# Patient Record
Sex: FEMALE | Race: BLACK OR AFRICAN AMERICAN | NOT HISPANIC OR LATINO | Employment: UNEMPLOYED
[De-identification: names, ages, dates, MRNs, and addresses within clinical notes are randomized per-mention and may not be internally consistent; named-entity substitution may affect disease eponyms.]

---

## 2017-01-02 ENCOUNTER — RECORDS - HEALTHEAST (OUTPATIENT)
Dept: ADMINISTRATIVE | Facility: OTHER | Age: 32
End: 2017-01-02

## 2017-06-12 ENCOUNTER — HOSPITAL ENCOUNTER (EMERGENCY)
Facility: CLINIC | Age: 32
Discharge: HOME | End: 2017-06-12
Attending: EMERGENCY MEDICINE | Admitting: EMERGENCY MEDICINE
Payer: COMMERCIAL

## 2017-06-12 ENCOUNTER — APPOINTMENT (OUTPATIENT)
Dept: CT IMAGING | Facility: CLINIC | Age: 32
End: 2017-06-12
Attending: EMERGENCY MEDICINE
Payer: COMMERCIAL

## 2017-06-12 VITALS
SYSTOLIC BLOOD PRESSURE: 143 MMHG | DIASTOLIC BLOOD PRESSURE: 99 MMHG | TEMPERATURE: 98.5 F | RESPIRATION RATE: 18 BRPM | OXYGEN SATURATION: 96 % | HEART RATE: 84 BPM

## 2017-06-12 DIAGNOSIS — R55 SYNCOPE AND COLLAPSE: ICD-10-CM

## 2017-06-12 LAB
ANION GAP SERPL CALCULATED.3IONS-SCNC: 4 MMOL/L (ref 3–14)
BASOPHILS # BLD AUTO: 0 10E9/L (ref 0–0.2)
BASOPHILS NFR BLD AUTO: 0.4 %
BUN SERPL-MCNC: 8 MG/DL (ref 7–30)
CALCIUM SERPL-MCNC: 9.3 MG/DL (ref 8.5–10.1)
CHLORIDE SERPL-SCNC: 107 MMOL/L (ref 94–109)
CO2 SERPL-SCNC: 27 MMOL/L (ref 20–32)
CREAT SERPL-MCNC: 0.74 MG/DL (ref 0.52–1.04)
DIFFERENTIAL METHOD BLD: NORMAL
EOSINOPHIL # BLD AUTO: 0.1 10E9/L (ref 0–0.7)
EOSINOPHIL NFR BLD AUTO: 1.4 %
ERYTHROCYTE [DISTWIDTH] IN BLOOD BY AUTOMATED COUNT: 14.1 % (ref 10–15)
GFR SERPL CREATININE-BSD FRML MDRD: ABNORMAL ML/MIN/1.7M2
GLUCOSE SERPL-MCNC: 101 MG/DL (ref 70–99)
HCT VFR BLD AUTO: 45.1 % (ref 35–47)
HGB BLD-MCNC: 14.4 G/DL (ref 11.7–15.7)
IMM GRANULOCYTES # BLD: 0 10E9/L (ref 0–0.4)
IMM GRANULOCYTES NFR BLD: 0.4 %
LYMPHOCYTES # BLD AUTO: 2.6 10E9/L (ref 0.8–5.3)
LYMPHOCYTES NFR BLD AUTO: 35.4 %
MCH RBC QN AUTO: 28.3 PG (ref 26.5–33)
MCHC RBC AUTO-ENTMCNC: 31.9 G/DL (ref 31.5–36.5)
MCV RBC AUTO: 89 FL (ref 78–100)
MONOCYTES # BLD AUTO: 0.5 10E9/L (ref 0–1.3)
MONOCYTES NFR BLD AUTO: 7.3 %
NEUTROPHILS # BLD AUTO: 4 10E9/L (ref 1.6–8.3)
NEUTROPHILS NFR BLD AUTO: 55.1 %
NRBC # BLD AUTO: 0 10*3/UL
NRBC BLD AUTO-RTO: 0 /100
PLATELET # BLD AUTO: 269 10E9/L (ref 150–450)
POTASSIUM SERPL-SCNC: 3.6 MMOL/L (ref 3.4–5.3)
RBC # BLD AUTO: 5.08 10E12/L (ref 3.8–5.2)
SODIUM SERPL-SCNC: 138 MMOL/L (ref 133–144)
WBC # BLD AUTO: 7.2 10E9/L (ref 4–11)

## 2017-06-12 PROCEDURE — 93005 ELECTROCARDIOGRAM TRACING: CPT

## 2017-06-12 PROCEDURE — 80048 BASIC METABOLIC PNL TOTAL CA: CPT | Performed by: EMERGENCY MEDICINE

## 2017-06-12 PROCEDURE — 99285 EMERGENCY DEPT VISIT HI MDM: CPT | Mod: 25

## 2017-06-12 PROCEDURE — 70450 CT HEAD/BRAIN W/O DYE: CPT

## 2017-06-12 PROCEDURE — 85025 COMPLETE CBC W/AUTO DIFF WBC: CPT | Performed by: EMERGENCY MEDICINE

## 2017-06-12 PROCEDURE — 96360 HYDRATION IV INFUSION INIT: CPT

## 2017-06-12 PROCEDURE — 25000128 H RX IP 250 OP 636: Performed by: EMERGENCY MEDICINE

## 2017-06-12 PROCEDURE — 96361 HYDRATE IV INFUSION ADD-ON: CPT

## 2017-06-12 RX ORDER — SODIUM CHLORIDE 9 MG/ML
1000 INJECTION, SOLUTION INTRAVENOUS CONTINUOUS
Status: DISCONTINUED | OUTPATIENT
Start: 2017-06-12 | End: 2017-06-12 | Stop reason: HOSPADM

## 2017-06-12 RX ADMIN — SODIUM CHLORIDE 1000 ML: 9 INJECTION, SOLUTION INTRAVENOUS at 18:03

## 2017-06-12 NOTE — LETTER
[facility] EMERGENCY DEPARTMENT  [address] [place] [address]  [phone]    2017    Ivelisse Castillo  [address] [place] [address]  [phone] (home)     : 1985      To Whom it may concern:    Ivelisse Castillo was seen in our Emergency Department today, 2017.  I expect her condition to improve over the next 1-2 days.  She may return to work/school when improved.    Sincerely,        Donald Edwards

## 2017-06-12 NOTE — ED NOTES
"Patient had a syncopal episode today in the bathroom. States that her hands started shaking and vision got \"blurry\". Boyfriend heard a noise this AM and found patient on the bathroom floor. Deneis pain but states she feels \"sleepy\". Was sent by clinic for further evaluation.  "

## 2017-06-12 NOTE — ED NOTES
Pt educated on NS. Pt resting in cot, monitors on, respirations equal and unlabored. Pt updated on POC and CT

## 2017-06-12 NOTE — ED AVS SNAPSHOT
[facility] Emergency Department    [address]    [facility] [address]    Phone:  [phone]    Fax:  [phone]                                       Ivelisse Castillo   MRN: [id number]    Department:  [facility] Emergency Department   Date of Visit:  6/12/2017           After Visit Summary Signature Page     I have received my discharge instructions, and my questions have been answered. I have discussed any challenges I see with this plan with the nurse or doctor.    ..........................................................................................................................................  Patient/Patient Representative Signature      ..........................................................................................................................................  Patient Representative Print Name and Relationship to Patient    ..................................................               ................................................  Date                                            Time    ..........................................................................................................................................  Reviewed by Signature/Title    ...................................................              ..............................................  Date                                                            Time

## 2017-06-12 NOTE — ED AVS SNAPSHOT
[facility] Emergency Department    [address]    [facility] [address]    Phone:  [phone]    Fax:  [phone]                                       Ivelisse Castillo   MRN: [id number]    Department:  [facility] Emergency Department   Date of Visit:  6/12/2017           Patient Information     Date Of Birth          1985        Your diagnoses for this visit were:     Syncope and collapse        You were seen by Donald Edwards MD.        Discharge Instructions       Please make an appointment to follow up with your primary care provider in 2-3 days for recheck and consider tilt table testing or referral for eeg.      Discharge Instructions  Syncope    Syncope (fainting) is a sudden, short loss of consciousness (passing out spell). People will usually fall to the ground when they faint or slump over if seated.  At this time your doctor does not find that your fainting spell is a sign of anything dangerous or life-threatening.  However, sometimes the signs of serious illness do not show up right away.  If you have new or worse symptoms, you may need to be seen again in the Emergency Department or by your primary doctor. Be sure to follow up as directed, or at least within 1 week.      Return to the Emergency Department if:    You faint again.     You have any significant bleeding.    You have chest pain or fast heartbeat, or if your heartbeat is irregular or skipping beats.    You feel short of breath.    You cough up any blood.    You have belly (abdominal) pain or unusual back pain.    You have ongoing vomiting (throwing up) or diarrhea, or have a black or tarry bowel movement or blood in the bowels or blood in your vomit.    You have a fever over 101 degrees.    You lose feeling or cannot move a part of your body or cannot talk normally.    You are confused, have a headache, cannot see well, or have a seizure.    DO NOT DRIVE. CALL 911 INSTEAD!    What  can I do to help myself?    Follow any specific instructions that your provider discussed with you.    If you feel light-headed, make sure to sit down right away, even if you have to sit on the floor.    Follow up with your regular medical doctor as discussed for further management. This may include lowering your blood pressure medications, insulin or other diabetic medications, checking your blood sugar more frequently, and drinking more fluids, taking medicines for vomiting or diarrhea or getting up slower.  If you were given a prescription for medicine here today, be sure to read all of the information (including the package insert) that comes with your prescription.  This will include important information about the medicine, its side effects, and any warnings that you need to know about.  The pharmacist who fills the prescription can provide more information and answer questions you may have about the medicine.  If you have questions or concerns that the pharmacist cannot address, please call or return to the Emergency Department.       Remember that you can always come back to the Emergency Department if you are not able to see your regular doctor in the amount of time listed above, if you get any new symptoms, or if there is anything that worries you.        24 Hour Appointment Hotline       To make an appointment at any [facility], call [phone] ([phone]). If you don't have a family doctor or clinic, we will help you find one. [facility] clinics are conveniently located to serve the needs of you and your family.             Review of your medicines      Our records show that you are taking the medicines listed below. If these are incorrect, please call your family doctor or clinic.        Dose / Directions Last dose taken    ABILIFY PO   Dose:  10 mg        Take 10 mg by mouth   Refills:  0        LAMICTAL PO   Dose:  200 mg        Take 200 mg by mouth   Refills:  0        TOPAMAX PO   Dose:   50 mg        Take 50 mg by mouth   Refills:  0                Procedures and tests performed during your visit     Basic metabolic panel    CBC with platelets differential    Cardiac Continuous Monitoring    EKG 12 lead    Head CT w/o contrast    Orthostatic blood pressure and pulse      Orders Needing Specimen Collection     None      Pending Results     No orders found from 6/10/2017 to 6/13/2017.            Pending Culture Results     No orders found from 6/10/2017 to 6/13/2017.            Pending Results Instructions     If you had any lab results that were not finalized at the time of your Discharge, you can call the ED Lab Result RN at [phone]. You will be contacted by this team for any positive Lab results or changes in treatment. The nurses are available 7 days a week from 10A to 6:30P.  You can leave a message 24 hours per day and they will return your call.        Test Results From Your Hospital Stay        6/12/2017  5:46 PM      Component Results     Component Value Ref Range & Units Status    WBC 7.2 4.0 - 11.0 10e9/L Final    RBC Count 5.08 3.8 - 5.2 10e12/L Final    Hemoglobin 14.4 11.7 - 15.7 g/dL Final    Hematocrit 45.1 35.0 - 47.0 % Final    MCV 89 78 - 100 fl Final    MCH 28.3 26.5 - 33.0 pg Final    MCHC 31.9 31.5 - 36.5 g/dL Final    RDW 14.1 10.0 - 15.0 % Final    Platelet Count 269 150 - 450 10e9/L Final    Diff Method Automated Method  Final    % Neutrophils 55.1 % Final    % Lymphocytes 35.4 % Final    % Monocytes 7.3 % Final    % Eosinophils 1.4 % Final    % Basophils 0.4 % Final    % Immature Granulocytes 0.4 % Final    Nucleated RBCs 0 0 /100 Final    Absolute Neutrophil 4.0 1.6 - 8.3 10e9/L Final    Absolute Lymphocytes 2.6 0.8 - 5.3 10e9/L Final    Absolute Monocytes 0.5 0.0 - 1.3 10e9/L Final    Absolute Eosinophils 0.1 0.0 - 0.7 10e9/L Final    Absolute Basophils 0.0 0.0 - 0.2 10e9/L Final    Abs Immature Granulocytes 0.0 0 - 0.4 10e9/L Final    Absolute Nucleated RBC 0.0  Final          6/12/2017  6:04 PM      Component Results     Component Value Ref Range & Units Status    Sodium 138 133 - 144 mmol/L Final    Potassium 3.6 3.4 - 5.3 mmol/L Final    Chloride 107 94 - 109 mmol/L Final    Carbon Dioxide 27 20 - 32 mmol/L Final    Anion Gap 4 3 - 14 mmol/L Final    Glucose 101 (H) 70 - 99 mg/dL Final    Urea Nitrogen 8 7 - 30 mg/dL Final    Creatinine 0.74 0.52 - 1.04 mg/dL Final    GFR Estimate >90  Non  GFR Calc   >60 mL/min/1.7m2 Final    GFR Estimate If Black >90   GFR Calc   >60 mL/min/1.7m2 Final    Calcium 9.3 8.5 - 10.1 mg/dL Final         6/12/2017  6:53 PM      Narrative     CT SCAN OF THE HEAD WITHOUT CONTRAST   6/12/2017 6:43 PM     HISTORY: Syncopal episode today with hand started shaking and vision  blurry. Possible seizure.    TECHNIQUE:  Axial images of the head and coronal reformations without  IV contrast material. Radiation dose for this scan was reduced using  automated exposure control, adjustment of the mA and/or kV according  to patient size, or iterative reconstruction technique.    COMPARISON: None.    FINDINGS:  The ventricles are normal in size, shape and configuration.   The brain parenchyma and subarachnoid spaces are normal. There is no  evidence of intracranial hemorrhage, mass, acute infarct or anomaly.     The visualized portions of the sinuses and mastoids appear normal.  There is no evidence of trauma.        Impression     IMPRESSION: Normal CT scan of the head.      BARB IRAHETA MD                Clinical Quality Measure: Blood Pressure Screening     Your blood pressure was checked while you were in the emergency department today. The last reading we obtained was  BP: (!) 143/99 . Please read the guidelines below about what these numbers mean and what you should do about them.  If your systolic blood pressure (the top number) is less than 120 and your diastolic blood pressure (the bottom number) is less than 80, then your  "blood pressure is normal. There is nothing more that you need to do about it.  If your systolic blood pressure (the top number) is 120-139 or your diastolic blood pressure (the bottom number) is 80-89, your blood pressure may be higher than it should be. You should have your blood pressure rechecked within a year by a primary care provider.  If your systolic blood pressure (the top number) is 140 or greater or your diastolic blood pressure (the bottom number) is 90 or greater, you may have high blood pressure. High blood pressure is treatable, but if left untreated over time it can put you at risk for heart attack, stroke, or kidney failure. You should have your blood pressure rechecked by a primary care provider within the next 4 weeks.  If your provider in the emergency department today gave you specific instructions to follow-up with your doctor or provider even sooner than that, you should follow that instruction and not wait for up to 4 weeks for your follow-up visit.        Thank you for choosing [facility]       Thank you for choosing [facility] for your care. Our goal is always to provide you with excellent care. Hearing back from our patients is one way we can continue to improve our services. Please take a few minutes to complete the written survey that you may receive in the mail after you visit with us. Thank you!        [facility] Information     [employer] lets you send messages to your doctor, view your test results, renew your prescriptions, schedule appointments and more. To sign up, go to [email or web address] . Click on \"Log in\" on the left side of the screen, which will take you to the Welcome page. Then click on \"Sign up Now\" on the right side of the page.     You will be asked to enter the access code listed below, as well as some personal information. Please follow the directions to create your username and password.     Your access code is: [email or web address]  Expires: 9/10/2017  8:36 PM     Your access " code will  in 90 days. If you need help or a new code, please call your [facility] clinic or [phone].        Care EveryWhere ID     This is your Care EveryWhere ID. This could be used by other organizations to access your [facility] medical records  [id number]        After Visit Summary       This is your record. Keep this with you and show to your community pharmacist(s) and doctor(s) at your next visit.

## 2017-06-13 LAB — INTERPRETATION ECG - MUSE: NORMAL

## 2017-06-13 NOTE — ED PROVIDER NOTES
Please See Dictated Note for Details regarding of encounter of same date    Dictation # - [id number]    12:49 AM  6/13/2017    Donald Edwards MD  [facility]  [facility]  [facility] Emergency Department               Donald Edwards MD  06/13/17 0049

## 2017-06-13 NOTE — ED PROVIDER NOTES
CHIEF COMPLAINT:  Syncope.      HISTORY OF PRESENT ILLNESS:  Ivelisse Castillo is a 31-year-old female, presenting with her significant other, with no significant history of seizures, syncope, cardiovascular or cardiopulmonary disease, but she had a syncopal episode around 6:30 this morning.  Over the last two days, she has been in her normal well state of health.  This morning she woke up at about 6:00, went to the bathroom, finished urinating, and was at the counter brushing her teeth when all of a sudden she started to feel heavy in her legs, shaky all over, and then her vision fading out.  She then lost consciousness for a short period of time.  Her significant other heard this, came into the bathroom, and by that time she was already starting to become more alert again.  There was no incontinence or tongue biting.  She does state that the shaking started on her right hand and then moved elsewhere.  Denies any recent caffeine abuse, drug abuse, sleep deprivation or other known irritants.  No recent changes in her psychotropic medications.  Currently denies any significant headache, neck pain, visual change, extremity numbness, weakness, tingling, chest pain, or shortness of breath.  Denies any chance that she is pregnant.      REVIEW OF SYSTEMS:  Negative other than mentioned above in the HPI.      PAST MEDICAL HISTORY, PAST SURGICAL HISTORY, MEDICATIONS, ALLERGIES:  All reviewed in Epic.      PHYSICAL EXAMINATION:   VITAL SIGNS:  Reviewed in Epic.  Mildly hypertensive, otherwise unremarkable.   GENERAL:  Sitting in bed in no acute distress.   HEENT:  Normocephalic, atraumatic.  Pupils are 4 mm and reactive bilaterally.  Extraocular movements are intact.  Visual acuity is grossly intact.   NECK:  Painless range of motion.  No midline tenderness to palpation.   CHEST:  Clear to auscultation bilaterally.   HEART:  No significant murmurs, rubs or gallops.  Peripheral pulses are symmetric in upper and lower  extremities.   LUNGS:  Clear to auscultation bilaterally.   ABDOMEN:  Soft.   EXTREMITIES:  No edema.  Skeletal survey is unremarkable for significant deformity injury, pain, or effusions.   NEUROLOGIC:  Alert, oriented.  Cranial nerves II-XII intact and symmetric.  5/5 strength in upper and lower extremities.  Coordination is intact.  Gait stable.   PSYCHIATRY:  Normal mood, normal affect.      LABS AND IMAGING:  See Epic for full report.  In brief, EKG without significant signs of ischemia or malignant arrhythmia.  CT of the head is negative.  CBC and BMP are unremarkable.      MEDICAL DECISION MAKING:  This is a 31-year-old female here with an episode of seizure versus syncope.  Currently normal neurologic examination.  No high risk features for cardiovascular etiology by EKG, comorbidities, or vital sign exam here.  Workup here unremarkable for both provoking etiology of first-time seizure or syncope.  I think she can be safely discharged home, to follow up with primary care provider to consider heart monitoring, tilt-table, EEG testing.  She and her significant other are comfortable and agreeable with that plan and she was discharged home.      DIAGNOSIS:  Syncope.         AYAZ GARRETT MD             D: 2017 00:49   T: 2017 04:05   MT: [id number]      Name:     BRETT PINON   MRN:      [id number]        Account:      [id number]   :      1985           Visit Date:   2017      Document: [id number]       cc: Desi Murphy MD

## 2019-02-12 ENCOUNTER — OFFICE VISIT (OUTPATIENT)
Dept: FAMILY MEDICINE | Facility: CLINIC | Age: 34
End: 2019-02-12
Payer: COMMERCIAL

## 2019-02-12 VITALS
OXYGEN SATURATION: 100 % | HEART RATE: 94 BPM | HEIGHT: 71 IN | SYSTOLIC BLOOD PRESSURE: 120 MMHG | TEMPERATURE: 98.7 F | BODY MASS INDEX: 28.28 KG/M2 | WEIGHT: 202 LBS | DIASTOLIC BLOOD PRESSURE: 80 MMHG | RESPIRATION RATE: 22 BRPM

## 2019-02-12 DIAGNOSIS — Z00.00 ROUTINE GENERAL MEDICAL EXAMINATION AT A HEALTH CARE FACILITY: Primary | ICD-10-CM

## 2019-02-12 DIAGNOSIS — F17.210 CIGARETTE NICOTINE DEPENDENCE WITHOUT COMPLICATION: ICD-10-CM

## 2019-02-12 DIAGNOSIS — Z11.3 SCREEN FOR STD (SEXUALLY TRANSMITTED DISEASE): ICD-10-CM

## 2019-02-12 DIAGNOSIS — F31.9 BIPOLAR 1 DISORDER (H): ICD-10-CM

## 2019-02-12 DIAGNOSIS — Z12.4 SCREENING FOR MALIGNANT NEOPLASM OF CERVIX: ICD-10-CM

## 2019-02-12 LAB
ERYTHROCYTE [DISTWIDTH] IN BLOOD BY AUTOMATED COUNT: 14.4 % (ref 10–15)
HBA1C MFR BLD: 5.7 % (ref 0–5.6)
HCT VFR BLD AUTO: 40.2 % (ref 35–47)
HGB BLD-MCNC: 12.9 G/DL (ref 11.7–15.7)
MCH RBC QN AUTO: 27.9 PG (ref 26.5–33)
MCHC RBC AUTO-ENTMCNC: 32.1 G/DL (ref 31.5–36.5)
MCV RBC AUTO: 87 FL (ref 78–100)
PLATELET # BLD AUTO: 252 10E9/L (ref 150–450)
RBC # BLD AUTO: 4.62 10E12/L (ref 3.8–5.2)
WBC # BLD AUTO: 5.2 10E9/L (ref 4–11)

## 2019-02-12 PROCEDURE — 85027 COMPLETE CBC AUTOMATED: CPT | Performed by: FAMILY MEDICINE

## 2019-02-12 PROCEDURE — 86695 HERPES SIMPLEX TYPE 1 TEST: CPT | Performed by: FAMILY MEDICINE

## 2019-02-12 PROCEDURE — 36415 COLL VENOUS BLD VENIPUNCTURE: CPT | Performed by: FAMILY MEDICINE

## 2019-02-12 PROCEDURE — 80061 LIPID PANEL: CPT | Performed by: FAMILY MEDICINE

## 2019-02-12 PROCEDURE — 86696 HERPES SIMPLEX TYPE 2 TEST: CPT | Performed by: FAMILY MEDICINE

## 2019-02-12 PROCEDURE — 87389 HIV-1 AG W/HIV-1&-2 AB AG IA: CPT | Performed by: FAMILY MEDICINE

## 2019-02-12 PROCEDURE — G0145 SCR C/V CYTO,THINLAYER,RESCR: HCPCS | Performed by: FAMILY MEDICINE

## 2019-02-12 PROCEDURE — 87624 HPV HI-RISK TYP POOLED RSLT: CPT | Performed by: FAMILY MEDICINE

## 2019-02-12 PROCEDURE — 83036 HEMOGLOBIN GLYCOSYLATED A1C: CPT | Performed by: FAMILY MEDICINE

## 2019-02-12 PROCEDURE — 0064U ANTB TP TOTAL&RPR IA QUAL: CPT | Performed by: FAMILY MEDICINE

## 2019-02-12 PROCEDURE — 80053 COMPREHEN METABOLIC PANEL: CPT | Performed by: FAMILY MEDICINE

## 2019-02-12 PROCEDURE — 99385 PREV VISIT NEW AGE 18-39: CPT | Performed by: FAMILY MEDICINE

## 2019-02-12 RX ORDER — LAMOTRIGINE 100 MG/1
TABLET ORAL
Status: ON HOLD | COMMUNITY
Start: 2019-01-20 | End: 2020-06-23

## 2019-02-12 RX ORDER — HYDROXYZINE PAMOATE 25 MG/1
CAPSULE ORAL
Status: ON HOLD | COMMUNITY
Start: 2019-01-10 | End: 2020-06-23

## 2019-02-12 ASSESSMENT — ENCOUNTER SYMPTOMS
SHORTNESS OF BREATH: 0
WEAKNESS: 0
MYALGIAS: 0
HEADACHES: 0
DIARRHEA: 0
HEMATURIA: 0
DIZZINESS: 0
ABDOMINAL PAIN: 0
FEVER: 0
JOINT SWELLING: 0
ARTHRALGIAS: 0
CHILLS: 0
DYSURIA: 0
FREQUENCY: 0
PARESTHESIAS: 0
NERVOUS/ANXIOUS: 1
CONSTIPATION: 0
EYE PAIN: 0
NAUSEA: 0
HEARTBURN: 0

## 2019-02-12 ASSESSMENT — MIFFLIN-ST. JEOR: SCORE: 1712.4

## 2019-02-12 NOTE — LETTER
February 14, 2019      Ivelisse Castillo  [address] [address]  [facility] [address]        Dear ,    We are writing to inform you of your test results.    Your cholesterol, kidney and liver function from recent visit are normal.   Your STD tests show you do have a history of herpes. HIV and syphilis are negative.   A1c (your blood sugars over the last 3 months) is elevated. You need to work on bringing this down throuhg diet and exercise.   At least a 7 % weight loss is helpful for this.   For further questions or concerns please let us know.     Resulted Orders   Lipid panel reflex to direct LDL Fasting   Result Value Ref Range    Cholesterol 152 <200 mg/dL    Triglycerides 50 <150 mg/dL      Comment:      Fasting specimen    HDL Cholesterol 57 >49 mg/dL    LDL Cholesterol Calculated 85 <100 mg/dL      Comment:      Desirable:       <100 mg/dl    Non HDL Cholesterol 95 <130 mg/dL   Comprehensive metabolic panel   Result Value Ref Range    Sodium 138 133 - 144 mmol/L    Potassium 3.7 3.4 - 5.3 mmol/L    Chloride 105 94 - 109 mmol/L    Carbon Dioxide 28 20 - 32 mmol/L    Anion Gap 5 3 - 14 mmol/L    Glucose 96 70 - 99 mg/dL      Comment:      Fasting specimen    Urea Nitrogen 13 7 - 30 mg/dL    Creatinine 0.86 0.52 - 1.04 mg/dL    GFR Estimate 88 >60 mL/min/[1.73_m2]      Comment:      Non  GFR Calc  Starting 12/18/2018, serum creatinine based estimated GFR (eGFR) will be   calculated using the Chronic Kidney Disease Epidemiology Collaboration   (CKD-EPI) equation.      GFR Estimate If Black >90 >60 mL/min/[1.73_m2]      Comment:       GFR Calc  Starting 12/18/2018, serum creatinine based estimated GFR (eGFR) will be   calculated using the Chronic Kidney Disease Epidemiology Collaboration   (CKD-EPI) equation.      Calcium 9.1 8.5 - 10.1 mg/dL    Bilirubin Total 0.2 0.2 - 1.3 mg/dL    Albumin 3.7 3.4 - 5.0 g/dL    Protein Total 7.4 6.8 - 8.8 g/dL    Alkaline  Phosphatase 114 40 - 150 U/L    ALT 22 0 - 50 U/L    AST 18 0 - 45 U/L   CBC with platelets   Result Value Ref Range    WBC 5.2 4.0 - 11.0 10e9/L    RBC Count 4.62 3.8 - 5.2 10e12/L    Hemoglobin 12.9 11.7 - 15.7 g/dL    Hematocrit 40.2 35.0 - 47.0 %    MCV 87 78 - 100 fl    MCH 27.9 26.5 - 33.0 pg    MCHC 32.1 31.5 - 36.5 g/dL    RDW 14.4 10.0 - 15.0 %    Platelet Count 252 150 - 450 10e9/L   HIV Antigen Antibody Combo   Result Value Ref Range    HIV Antigen Antibody Combo Nonreactive NR^Nonreactive          Comment:      HIV-1 p24 Ag & HIV-1/HIV-2 Ab Not Detected   Treponema Abs w Reflex to RPR and Titer   Result Value Ref Range    Treponema Antibodies Nonreactive NR^Nonreactive   Hemoglobin A1c   Result Value Ref Range    Hemoglobin A1C 5.7 (H) 0 - 5.6 %      Comment:      Normal <5.7% Prediabetes 5.7-6.4%  Diabetes 6.5% or higher - adopted from ADA   consensus guidelines.     Herpes Simplex Virus 1 and 2 IgG   Result Value Ref Range    Herpes Simplex Virus Type 1 IgG >8.0 (H) 0.0 - 0.8 AI      Comment:      Positive.  IgG antibody to HSV-1 detected.  Antibody index (AI) values reflect qualitative changes in antibody   concentration that cannot be directly associated with clinical condition or   disease state.      Herpes Simplex Virus Type 2 IgG >8.0 (H) 0.0 - 0.8 AI      Comment:      Positive.  IgG antibody to HSV-2 detected.  Antibody index (AI) values reflect qualitative changes in antibody   concentration that cannot be directly associated with clinical condition or   disease state.         If you have any questions or concerns, please call the clinic at the number listed above.       Sincerely,        Lissa Jimenez MD/phillip

## 2019-02-12 NOTE — PATIENT INSTRUCTIONS
every 1 to 2 hours for the first 6 weeks, followed by one piece every 2 to 4 hours for 3 weeks, and then one piece every 4 to 8 hours for 3 weeks.  Patient Education     The Benefits of Living Smoke Free  What do you want to gain from quitting? Check off some reasons to quit.  Health benefits  ___  Improve my ability to breathe without coughing or shortness of breath  ___  Reduce my risk of lung cancer, heart disease, chronic lung disease  ___  Have fewer wrinkles and softer skin  ___  Improve my sense of taste and smell  ___  For pregnant women--reduce the risk of having a miscarriage, stillbirth, premature birth, or low-birth-weight baby  Personal benefits  ___  Feel more in control of my life  ___  Have better-smelling hair, breath, clothes, home, and car  ___  Save time by not having to take smoke breaks, buy cigarettes, or hunt for a light  ___  Have whiter teeth  Family benefits  ___  Reduce my children s respiratory tract infections  ___  Set a good example for my children  ___  Reduce my family s cancer risk  Financial benefits  ___  Save hundreds of dollars each year that would be spent on cigarettes  ___  Save money on medical bills  ___  Save on life, health, and car insurance premiums     Those dollars add up!  Cigarettes are expensive, and getting more expensive all the time. Do you realize how much money you are spending on cigarettes per year? What is the average amount you spend on a pack of cigarettes? What is the average number of packs that you smoke per day? Using your answers to these questions, fill in this formula to help you find out:  ($ _____ per pack) ×  ( _____ number of packs per day) × (365 days) =  $ _____ yearly cost of smoking  Besides tobacco, there are other costs, including extra cleaning bills and replacement costs for clothing and furniture; medical expenses for smoking-related illnesses; and higher health, life, and car insurance premiums.  Cigars and pipes count too!  Cigars  and pipes are also dangerous. So are smokeless (chewing) tobacco and snuff. All of these products contain nicotine, a highly addictive substance that has harmful effects on your body. Quitting smoking means giving up all tobacco products.      For more information    https://smokefree.gov/zvtn-vd-nq-expert    National [employer] [place] Smoking Quitline: 877-44U-QUIT (665-502-4793)   Date Last Reviewed: 2/1/2017 2000-2018 [employer]. [address] [place] [address]. All rights reserved. This information is not intended as a substitute for professional medical care. Always follow your healthcare professional's instructions.           Patient Education     How to Quit Smoking  Smoking is one of the hardest habits to break. About half of all people who have ever smoked have been able to quit. Most people who still smoke want to quit. Here are some of the best ways to stop smoking.    Keep trying  Most smokers make many attempts at quitting before they are successful. It s important not to give up.  Go cold turkey  Most former smokers quit cold turkey (all at once). Trying to cut back gradually doesn't seem to work as well, perhaps because it continues the smoking habit. Also, it is possible to inhale more while smoking fewer cigarettes. This results in the same amount of nicotine in your body.  Get support  Support programs can be a big help, especially for heavy smokers. These groups offer lectures, ways to change behavior, and peer support. Here are some ways to find a support program:    Free national quitline: 800-QUIT-NOW (184-487-5313).    Hospital quit-smoking programs.    American Lung Association: (810.683.2118).    American Cancer Society (858-541-7408).  Support at home is important too. Nonsmokers can offer praise and encouragement. If the smoker in your life finds it hard to quit, encourage them to keep trying.  Over-the-counter medicines  Nicotine replacement therapy may make  "quitting easier. Certain aids, such as the nicotine patch, gum, and lozenges, are available without a prescription. It is best to use these under a doctor s care, though. The skin patch provides a steady supply of nicotine. Nicotine gum and lozenges give temporary bursts of low levels of nicotine. Both methods reduce the craving for cigarettes. Warning: If you have nausea, vomiting, dizziness, weakness, or a fast heartbeat, stop using these products and see your doctor.  Prescription medicines  After reviewing your smoking patterns and past attempts to quit, your doctor may offer a prescription medicine such as bupropion, varenicline, a nicotine inhaler, or nasal spray. Each has advantages and side effects. Your doctor can review these with you.  Health benefits of quitting  The benefits of quitting start right away and keep improving the longer you go without smoking. These benefits occur at any age.  So whether you are 17 or 70, quitting is a good decision. Some of the benefits include:    20 minutes: Blood pressure and pulse return to normal.    8 hours: Oxygen levels return to normal.    2 days: Ability to smell and taste begin to improve as damaged nerves regrow.    2 to 3 weeks: Circulation and lung function improve.    1 to 9 months: Coughing, congestion, and shortness of breath decrease; tiredness decreases.    1 year: Risk of heart attack decreases by half.    5 years: Risk of lung cancer decreases by half; risk of stroke becomes the same as a nonsmoker s.  For more on how to quit smoking, try these online resources:     Smokefree.gov    \"Clearing the Air\" booklet from the National [employer] [place]: smokefree.gov/sites/default/files/pdf/clearing-the-air-accessible.pdf  Date Last Reviewed: 3/1/2017    3424-0988 [employer]. [address] [place] [address], [place] [address]. All rights reserved. This information is not intended as a substitute for professional medical care. Always follow your healthcare " professional's instructions.

## 2019-02-12 NOTE — PROGRESS NOTES
SUBJECTIVE:   CC: Ivelisse Castillo is an 33 year old woman who presents for preventive health visit.     Physical   Annual:     Getting at least 3 servings of Calcium per day:  Yes    Bi-annual eye exam:  NO    Dental care twice a year:  NO    Sleep apnea or symptoms of sleep apnea:  None    Diet:  Regular (no restrictions)    Frequency of exercise:  2-3 days/week    Duration of exercise:  30-45 minutes    Taking medications regularly:  Yes    Medication side effects:  None    Additional concerns today:  No    PHQ-2 Total Score: 0    Other:   Smoking cessation  Per patient she smokes about 6-10 cigarrettes a day. Would like to try something for cessation.   Notes she cannot take chantix.         Today's PHQ-2 Score:   PHQ-2 ( 1999 Pfizer) 2/12/2019   Q1: Little interest or pleasure in doing things 0   Q2: Feeling down, depressed or hopeless 0   PHQ-2 Score 0   Q1: Little interest or pleasure in doing things Not at all   Q2: Feeling down, depressed or hopeless Not at all   PHQ-2 Score 0       Abuse: Current or Past(Physical, Sexual or Emotional)- No  Do you feel safe in your environment? Yes    Social History     Tobacco Use     Smoking status: Current Every Day Smoker     Packs/day: 1.00     Smokeless tobacco: Never Used   Substance Use Topics     Alcohol use: Yes     Comment: occ     Alcohol Use 2/12/2019   If you drink alcohol do you typically have greater than 3 drinks per day OR greater than 7 drinks per week? Not Applicable   No flowsheet data found.    Reviewed orders with patient.  Reviewed health maintenance and updated orders accordingly - Yes  Labs reviewed in EPIC    Mammogram not appropriate for this patient based on age.    Pertinent mammograms are reviewed under the imaging tab.  History of abnormal Pap smear: NO - age 30- 65 PAP every 3 years recommended     Reviewed and updated as needed this visit by clinical staff  Tobacco  Allergies  Meds  Soc Hx        Reviewed and updated as needed this  "visit by Provider            Review of Systems   Constitutional: Negative for chills and fever.   HENT: Negative for ear pain and hearing loss.    Eyes: Negative for pain.   Respiratory: Negative for shortness of breath.    Cardiovascular: Negative for peripheral edema.   Gastrointestinal: Negative for abdominal pain, constipation, diarrhea, heartburn and nausea.   Breasts:  Positive for tenderness. Negative for discharge.   Genitourinary: Negative for dysuria, frequency, hematuria, urgency and vaginal bleeding.   Musculoskeletal: Negative for arthralgias, joint swelling and myalgias.   Neurological: Negative for dizziness, weakness, headaches and paresthesias.   Psychiatric/Behavioral: Positive for mood changes. The patient is nervous/anxious.           OBJECTIVE:   /80 (BP Location: Right arm, Patient Position: Chair, Cuff Size: Adult Large)   Pulse 94   Temp 98.7  F (37.1  C) (Oral)   Resp 22   Ht 1.803 m (5' 11\")   Wt 91.6 kg (202 lb)   SpO2 100%   BMI 28.17 kg/m    Physical Exam  GENERAL: healthy, alert and no distress  EYES: Eyes grossly normal to inspection, PERRL and conjunctivae and sclerae normal  HENT: ear canals and TM's normal, nose and mouth without ulcers or lesions  NECK: no adenopathy, no asymmetry, masses, or scars and thyroid normal to palpation  RESP: lungs clear to auscultation - no rales, rhonchi or wheezes  CV: regular rate and rhythm, normal S1 S2,  ABDOMEN: soft, nontender, and bowel sounds normal   (female): normal female external genitalia, normal urethral meatus, vaginal mucosa pink, moist, well rugated, and normal cervix/adnexa/uterus without masses or discharge  MS: no gross musculoskeletal defects noted, no edema  SKIN: no suspicious lesions or rashes  NEURO: Normal strength and tone, mentation intact and speech normal  PSYCH: mentation appears normal, affect normal/bright    Diagnostic Test Results:  none     ASSESSMENT/PLAN:   1. Routine general medical examination at " "a health care facility  - Lipid panel reflex to direct LDL Fasting  - Comprehensive metabolic panel  - CBC with platelets  - Pap imaged thin layer screen with HPV - recommended age 30 - 65 years (select HPV order below)  - HPV High Risk Types DNA Cervical  - Hemoglobin A1c    2. Screening for malignant neoplasm of cervix  - Pap imaged thin layer screen with HPV - recommended age 30 - 65 years (select HPV order below)  - HPV High Risk Types DNA Cervical    3. Screen for STD (sexually transmitted disease)  - NEISSERIA GONORRHOEA PCR  - CHLAMYDIA TRACHOMATIS PCR  - HIV Antigen Antibody Combo  - Treponema Abs w Reflex to RPR and Titer  - Herpes Simplex Virus 1 and 2 IgG    4. Cigarette nicotine dependence without complication  - reviewed options. She prefers to start with gum- Rx given today   - nicotine polacrilex (NICORETTE) 4 MG gum; Place 1 each (4 mg) inside cheek as needed for smoking cessation  Dispense: 150 each; Refill: 1    5. Bipolar 1 disorder (H)  - continue with psych recs       COUNSELING:  Reviewed preventive health counseling, as reflected in patient instructions       Regular exercise       Healthy diet/nutrition       Safe sex practices/STD prevention    BP Readings from Last 1 Encounters:   02/12/19 120/80     Estimated body mass index is 28.17 kg/m  as calculated from the following:    Height as of this encounter: 1.803 m (5' 11\").    Weight as of this encounter: 91.6 kg (202 lb).      Weight management plan: Discussed healthy diet and exercise guidelines     reports that she has been smoking.  She has been smoking about 1.00 pack per day. she has never used smokeless tobacco.  Tobacco Cessation Action Plan: Pharmacotherapies : other Nicotine replacement    Counseling Resources:  ATP IV Guidelines  Pooled Cohorts Equation Calculator  Breast Cancer Risk Calculator  FRAX Risk Assessment  ICSI Preventive Guidelines  Dietary Guidelines for Americans, 2010  USDA's MyPlate  ASA Prophylaxis  Lung CA " Screening    Lissa Jimenez MD  [facility]

## 2019-02-12 NOTE — LETTER
February 21, 2019    Ivelisse MCDERMOTT Jonathan  [address] [id number]  [facility] [id number]    Dear ,  This letter is regarding your recent Pap smear (cervical cancer screening) and Human Papillomavirus (HPV) test.  We are happy to inform you that your Pap smear result is normal. Cervical cancer is closely linked with certain types of HPV. Your results showed no evidence of high-risk HPV.  Therefore we recommend you return in 3 years for your next pap smear.  You will still need to return to the clinic every year for an annual exam and other preventive tests.  If you have additional questions regarding this result, please call our registered nurse, Renetta at [phone].  Sincerely,    Lissa Jimenez MD/[facility]

## 2019-02-13 ENCOUNTER — TELEPHONE (OUTPATIENT)
Dept: FAMILY MEDICINE | Facility: CLINIC | Age: 34
End: 2019-02-13

## 2019-02-13 DIAGNOSIS — Z11.3 SCREEN FOR STD (SEXUALLY TRANSMITTED DISEASE): Primary | ICD-10-CM

## 2019-02-13 LAB
ALBUMIN SERPL-MCNC: 3.7 G/DL (ref 3.4–5)
ALP SERPL-CCNC: 114 U/L (ref 40–150)
ALT SERPL W P-5'-P-CCNC: 22 U/L (ref 0–50)
ANION GAP SERPL CALCULATED.3IONS-SCNC: 5 MMOL/L (ref 3–14)
AST SERPL W P-5'-P-CCNC: 18 U/L (ref 0–45)
BILIRUB SERPL-MCNC: 0.2 MG/DL (ref 0.2–1.3)
BUN SERPL-MCNC: 13 MG/DL (ref 7–30)
CALCIUM SERPL-MCNC: 9.1 MG/DL (ref 8.5–10.1)
CHLORIDE SERPL-SCNC: 105 MMOL/L (ref 94–109)
CHOLEST SERPL-MCNC: 152 MG/DL
CO2 SERPL-SCNC: 28 MMOL/L (ref 20–32)
CREAT SERPL-MCNC: 0.86 MG/DL (ref 0.52–1.04)
GFR SERPL CREATININE-BSD FRML MDRD: 88 ML/MIN/{1.73_M2}
GLUCOSE SERPL-MCNC: 96 MG/DL (ref 70–99)
HDLC SERPL-MCNC: 57 MG/DL
HIV 1+2 AB+HIV1 P24 AG SERPL QL IA: NONREACTIVE
HSV1 IGG SERPL QL IA: >8 AI (ref 0–0.8)
HSV2 IGG SERPL QL IA: >8 AI (ref 0–0.8)
LDLC SERPL CALC-MCNC: 85 MG/DL
NONHDLC SERPL-MCNC: 95 MG/DL
POTASSIUM SERPL-SCNC: 3.7 MMOL/L (ref 3.4–5.3)
PROT SERPL-MCNC: 7.4 G/DL (ref 6.8–8.8)
SODIUM SERPL-SCNC: 138 MMOL/L (ref 133–144)
T PALLIDUM AB SER QL: NONREACTIVE
TRIGL SERPL-MCNC: 50 MG/DL

## 2019-02-13 NOTE — TELEPHONE ENCOUNTER
Lab brought note that GC Chlamydia leaked in transit.  Advised I would order and she could come for lab only.  Declined to schedule today and states she will call back to schedule.  father

## 2019-02-14 NOTE — RESULT ENCOUNTER NOTE
Your cholesterol, kidney and liver function from recent visit are normal.   Your STD tests show you do have a history of herpes. HIV and syphilis are negative.   A1c (your blood sugars over the last 3 months) is elevated. You need to work on bringing this down throuhg diet and exercise.  At least a 7 % weight loss is helpful for this.   For further questions or concerns please let us know.       Dr. Velázquez

## 2019-02-15 LAB
COPATH REPORT: NORMAL
PAP: NORMAL

## 2019-02-18 LAB
FINAL DIAGNOSIS: NORMAL
HPV HR 12 DNA CVX QL NAA+PROBE: NEGATIVE
HPV16 DNA SPEC QL NAA+PROBE: NEGATIVE
HPV18 DNA SPEC QL NAA+PROBE: NEGATIVE
SPECIMEN DESCRIPTION: NORMAL
SPECIMEN SOURCE CVX/VAG CYTO: NORMAL

## 2020-06-21 ENCOUNTER — HOSPITAL ENCOUNTER (INPATIENT)
Facility: CLINIC | Age: 35
LOS: 14 days | Discharge: HOME | DRG: 885 | End: 2020-07-06
Attending: EMERGENCY MEDICINE | Admitting: PSYCHIATRY & NEUROLOGY
Payer: MEDICARE

## 2020-06-21 DIAGNOSIS — F31.9 BIPOLAR AFFECTIVE DISORDER, REMISSION STATUS UNSPECIFIED (H): ICD-10-CM

## 2020-06-21 DIAGNOSIS — F31.9 BIPOLAR 1 DISORDER (H): ICD-10-CM

## 2020-06-21 DIAGNOSIS — Z20.828 EXPOSURE TO SARS-ASSOCIATED CORONAVIRUS: ICD-10-CM

## 2020-06-21 DIAGNOSIS — F31.2 BIPOLAR AFFECTIVE DISORDER, CURRENTLY MANIC, SEVERE, WITH PSYCHOTIC FEATURES (H): ICD-10-CM

## 2020-06-21 DIAGNOSIS — E56.9 VITAMIN DEFICIENCY: Primary | ICD-10-CM

## 2020-06-21 DIAGNOSIS — F29 PSYCHOSIS, UNSPECIFIED PSYCHOSIS TYPE (H): ICD-10-CM

## 2020-06-21 LAB
AMPHETAMINES UR QL SCN: NEGATIVE
BARBITURATES UR QL: NEGATIVE
BENZODIAZ UR QL: NEGATIVE
CANNABINOIDS UR QL SCN: POSITIVE
COCAINE UR QL: NEGATIVE
ETHANOL UR QL SCN: NEGATIVE
HCG UR QL: NEGATIVE
OPIATES UR QL SCN: NEGATIVE

## 2020-06-21 PROCEDURE — C9803 HOPD COVID-19 SPEC COLLECT: HCPCS | Performed by: EMERGENCY MEDICINE

## 2020-06-21 PROCEDURE — 81025 URINE PREGNANCY TEST: CPT | Performed by: FAMILY MEDICINE

## 2020-06-21 PROCEDURE — 80307 DRUG TEST PRSMV CHEM ANLYZR: CPT | Performed by: FAMILY MEDICINE

## 2020-06-21 PROCEDURE — 96372 THER/PROPH/DIAG INJ SC/IM: CPT | Performed by: EMERGENCY MEDICINE

## 2020-06-21 PROCEDURE — 99291 CRITICAL CARE FIRST HOUR: CPT | Mod: Z6 | Performed by: EMERGENCY MEDICINE

## 2020-06-21 PROCEDURE — 25000128 H RX IP 250 OP 636: Performed by: EMERGENCY MEDICINE

## 2020-06-21 PROCEDURE — 80320 DRUG SCREEN QUANTALCOHOLS: CPT | Performed by: FAMILY MEDICINE

## 2020-06-21 PROCEDURE — 99285 EMERGENCY DEPT VISIT HI MDM: CPT | Mod: 25 | Performed by: EMERGENCY MEDICINE

## 2020-06-21 RX ORDER — OLANZAPINE 10 MG/2ML
10 INJECTION, POWDER, FOR SOLUTION INTRAMUSCULAR ONCE
Status: COMPLETED | OUTPATIENT
Start: 2020-06-21 | End: 2020-06-21

## 2020-06-21 RX ORDER — NAPROXEN 250 MG/1
250 TABLET ORAL ONCE
Status: DISCONTINUED | OUTPATIENT
Start: 2020-06-21 | End: 2020-06-23

## 2020-06-21 RX ORDER — OLANZAPINE 10 MG/1
10 TABLET, ORALLY DISINTEGRATING ORAL ONCE
Status: DISCONTINUED | OUTPATIENT
Start: 2020-06-21 | End: 2020-06-22

## 2020-06-21 RX ADMIN — OLANZAPINE 10 MG: 10 INJECTION, POWDER, LYOPHILIZED, FOR SOLUTION INTRAMUSCULAR at 23:54

## 2020-06-22 ENCOUNTER — TELEPHONE (OUTPATIENT)
Dept: BEHAVIORAL HEALTH | Facility: CLINIC | Age: 35
End: 2020-06-22

## 2020-06-22 LAB
SARS-COV-2 PCR COMMENT: NORMAL
SARS-COV-2 RNA SPEC QL NAA+PROBE: NEGATIVE
SARS-COV-2 RNA SPEC QL NAA+PROBE: NORMAL
SPECIMEN SOURCE: NORMAL
SPECIMEN SOURCE: NORMAL

## 2020-06-22 PROCEDURE — 99207 ZZC DOWN CODE DUE TO INITIAL EXAM: CPT | Performed by: NURSE PRACTITIONER

## 2020-06-22 PROCEDURE — 25000128 H RX IP 250 OP 636

## 2020-06-22 PROCEDURE — 96372 THER/PROPH/DIAG INJ SC/IM: CPT | Performed by: EMERGENCY MEDICINE

## 2020-06-22 PROCEDURE — 99221 1ST HOSP IP/OBS SF/LOW 40: CPT | Mod: 95 | Performed by: NURSE PRACTITIONER

## 2020-06-22 PROCEDURE — 12400001 ZZH R&B MH UMMC

## 2020-06-22 PROCEDURE — U0003 INFECTIOUS AGENT DETECTION BY NUCLEIC ACID (DNA OR RNA); SEVERE ACUTE RESPIRATORY SYNDROME CORONAVIRUS 2 (SARS-COV-2) (CORONAVIRUS DISEASE [COVID-19]), AMPLIFIED PROBE TECHNIQUE, MAKING USE OF HIGH THROUGHPUT TECHNOLOGIES AS DESCRIBED BY CMS-2020-01-R: HCPCS | Performed by: EMERGENCY MEDICINE

## 2020-06-22 PROCEDURE — 25000128 H RX IP 250 OP 636: Performed by: EMERGENCY MEDICINE

## 2020-06-22 RX ORDER — OLANZAPINE 10 MG/1
10 TABLET, ORALLY DISINTEGRATING ORAL ONCE
Status: DISCONTINUED | OUTPATIENT
Start: 2020-06-22 | End: 2020-06-22

## 2020-06-22 RX ORDER — ALUMINA, MAGNESIA, AND SIMETHICONE 2400; 2400; 240 MG/30ML; MG/30ML; MG/30ML
30 SUSPENSION ORAL EVERY 4 HOURS PRN
Status: DISCONTINUED | OUTPATIENT
Start: 2020-06-22 | End: 2020-07-06 | Stop reason: HOSPADM

## 2020-06-22 RX ORDER — LORAZEPAM 2 MG/ML
1-2 INJECTION INTRAMUSCULAR EVERY 4 HOURS PRN
Status: DISCONTINUED | OUTPATIENT
Start: 2020-06-22 | End: 2020-06-24

## 2020-06-22 RX ORDER — LORAZEPAM 1 MG/1
1-2 TABLET ORAL EVERY 4 HOURS PRN
Status: DISCONTINUED | OUTPATIENT
Start: 2020-06-22 | End: 2020-06-24

## 2020-06-22 RX ORDER — HALOPERIDOL 5 MG/ML
INJECTION INTRAMUSCULAR
Status: COMPLETED
Start: 2020-06-22 | End: 2020-06-22

## 2020-06-22 RX ORDER — TRAZODONE HYDROCHLORIDE 50 MG/1
50 TABLET, FILM COATED ORAL
Status: DISCONTINUED | OUTPATIENT
Start: 2020-06-22 | End: 2020-06-24

## 2020-06-22 RX ORDER — NAPROXEN 250 MG/1
500 TABLET ORAL ONCE
Status: DISCONTINUED | OUTPATIENT
Start: 2020-06-22 | End: 2020-06-23

## 2020-06-22 RX ORDER — DIPHENHYDRAMINE HYDROCHLORIDE 50 MG/ML
INJECTION INTRAMUSCULAR; INTRAVENOUS
Status: COMPLETED
Start: 2020-06-22 | End: 2020-06-22

## 2020-06-22 RX ORDER — HYDROXYZINE HYDROCHLORIDE 25 MG/1
25 TABLET, FILM COATED ORAL EVERY 4 HOURS PRN
Status: DISCONTINUED | OUTPATIENT
Start: 2020-06-22 | End: 2020-06-23

## 2020-06-22 RX ORDER — HALOPERIDOL 5 MG/ML
5 INJECTION INTRAMUSCULAR EVERY 4 HOURS PRN
Status: DISCONTINUED | OUTPATIENT
Start: 2020-06-22 | End: 2020-06-24

## 2020-06-22 RX ORDER — OLANZAPINE 10 MG/2ML
10 INJECTION, POWDER, FOR SOLUTION INTRAMUSCULAR ONCE
Status: COMPLETED | OUTPATIENT
Start: 2020-06-22 | End: 2020-06-22

## 2020-06-22 RX ORDER — DIPHENHYDRAMINE HYDROCHLORIDE 50 MG/ML
50 INJECTION INTRAMUSCULAR; INTRAVENOUS EVERY 4 HOURS PRN
Status: DISCONTINUED | OUTPATIENT
Start: 2020-06-22 | End: 2020-06-24

## 2020-06-22 RX ORDER — ACETAMINOPHEN 325 MG/1
650 TABLET ORAL EVERY 4 HOURS PRN
Status: DISCONTINUED | OUTPATIENT
Start: 2020-06-22 | End: 2020-06-22

## 2020-06-22 RX ORDER — HALOPERIDOL 5 MG/1
5 TABLET ORAL EVERY 4 HOURS PRN
Status: DISCONTINUED | OUTPATIENT
Start: 2020-06-22 | End: 2020-06-24

## 2020-06-22 RX ORDER — OLANZAPINE 10 MG/2ML
10 INJECTION, POWDER, FOR SOLUTION INTRAMUSCULAR
Status: DISCONTINUED | OUTPATIENT
Start: 2020-06-22 | End: 2020-06-24

## 2020-06-22 RX ORDER — IBUPROFEN 600 MG/1
600 TABLET, FILM COATED ORAL EVERY 6 HOURS PRN
Status: DISCONTINUED | OUTPATIENT
Start: 2020-06-22 | End: 2020-07-06 | Stop reason: HOSPADM

## 2020-06-22 RX ORDER — DIPHENHYDRAMINE HCL 50 MG
50 CAPSULE ORAL EVERY 4 HOURS PRN
Status: DISCONTINUED | OUTPATIENT
Start: 2020-06-22 | End: 2020-06-24

## 2020-06-22 RX ORDER — LORAZEPAM 2 MG/ML
INJECTION INTRAMUSCULAR
Status: COMPLETED
Start: 2020-06-22 | End: 2020-06-22

## 2020-06-22 RX ORDER — OLANZAPINE 10 MG/1
10 TABLET ORAL
Status: DISCONTINUED | OUTPATIENT
Start: 2020-06-22 | End: 2020-06-24

## 2020-06-22 RX ORDER — OLANZAPINE 10 MG/2ML
10 INJECTION, POWDER, FOR SOLUTION INTRAMUSCULAR ONCE
Status: DISCONTINUED | OUTPATIENT
Start: 2020-06-22 | End: 2020-06-22

## 2020-06-22 RX ADMIN — LORAZEPAM 2 MG: 2 INJECTION INTRAMUSCULAR; INTRAVENOUS at 12:37

## 2020-06-22 RX ADMIN — LORAZEPAM 2 MG: 2 INJECTION INTRAMUSCULAR at 12:37

## 2020-06-22 RX ADMIN — OLANZAPINE 10 MG: 10 INJECTION, POWDER, LYOPHILIZED, FOR SOLUTION INTRAMUSCULAR at 04:36

## 2020-06-22 RX ADMIN — HALOPERIDOL LACTATE 5 MG: 5 INJECTION, SOLUTION INTRAMUSCULAR at 12:36

## 2020-06-22 RX ADMIN — HALOPERIDOL 5 MG: 5 INJECTION INTRAMUSCULAR at 12:36

## 2020-06-22 RX ADMIN — DIPHENHYDRAMINE HYDROCHLORIDE 50 MG: 50 INJECTION INTRAMUSCULAR; INTRAVENOUS at 12:36

## 2020-06-22 RX ADMIN — DIPHENHYDRAMINE HYDROCHLORIDE 50 MG: 50 INJECTION, SOLUTION INTRAMUSCULAR; INTRAVENOUS at 12:36

## 2020-06-22 ASSESSMENT — ENCOUNTER SYMPTOMS
FEVER: 0
ARTHRALGIAS: 0
NAUSEA: 0
ABDOMINAL PAIN: 0
COUGH: 0
DIARRHEA: 0
VOMITING: 0
SHORTNESS OF BREATH: 0
HEADACHES: 0
AGITATION: 1
DYSPHORIC MOOD: 1
CONFUSION: 0
HYPERACTIVE: 1
COLOR CHANGE: 0
NERVOUS/ANXIOUS: 1
SORE THROAT: 0

## 2020-06-22 ASSESSMENT — ACTIVITIES OF DAILY LIVING (ADL)
SWALLOWING: 0-->SWALLOWS FOODS/LIQUIDS WITHOUT DIFFICULTY
ORAL_HYGIENE: INDEPENDENT
RETIRED_EATING: 0-->INDEPENDENT
DRESS: INDEPENDENT
LAUNDRY: UNABLE TO COMPLETE
BATHING: 0-->INDEPENDENT
RETIRED_COMMUNICATION: 0-->UNDERSTANDS/COMMUNICATES WITHOUT DIFFICULTY
DRESS: 0-->INDEPENDENT
WHICH_OF_THE_ABOVE_FUNCTIONAL_RISKS_HAD_A_RECENT_ONSET_OR_CHANGE?: COGNITION
FALL_HISTORY_WITHIN_LAST_SIX_MONTHS: NO
TOILETING: 0-->INDEPENDENT
HYGIENE/GROOMING: INDEPENDENT
COGNITION: 2 - DIFFICULTY WITH ORGANIZING THOUGHTS

## 2020-06-22 NOTE — ED TRIAGE NOTES
Per previous RN, was told that she was found by police outside of an extended stay (patient is currently homeless), and that she was being argumentive outside of the building and police were called. During the ride with paramedics she was aggressive and yelling.

## 2020-06-22 NOTE — ED NOTES
"Pt is awake and was looking at her 72 hour hold paperwork, this RN asked if she had anymore questions and she stated \"I can read, get out of my face\". RN explained she was only trying to be helpful, and that she understands that she can read. RN re- approached about doing COVID test and she stated \"I did not sign up for this, can you just get out my face? I want privacy. I don't want to do your tests.\" RN acknowledged her frustration and is giving her space.   "

## 2020-06-22 NOTE — ED PROVIDER NOTES
"    [facility] [place] ([facility])     June 21, 2020  History     Chief Complaint   Patient presents with     Anxiety     Police called to [place], pt homeless, recent job loss, anxious and uncooperative when asked to leave the hotel.     Aggressive Behavior     Refused to leave hotel, became loud and beligerent, 7 police officers called to scene.     HPI     Ivelisse Castillo is a 34 year old female with a medical history significant for nicotine dependence, bipolar 1 disorder, cannabis abuse, ADHD, and PTSD who presents to the ED today via EMS for agitation.    The patient reportedly was at an extended stay hotel and was causing a commotion and so police were called.  The paramedics reported that the patient was naked and they were covering her with a blanket.  She was quite agitated during much of the ambulance ride and when she came into the emergency department she was holding her head complaining about loud voices.  When staff tried to assess her, she refused to have her vital signs taken.  She was quite agitated and has wrapped herself in a bedsheet like a dress.  When psych associates were pointing out that they wanted her to be careful so that she would not trip on the portion of the sheet that was hanging on the floor, she became very angry at them.  The patient reports that she has a lot of anxiety and that is why she is here today.  She relates a very confusing and tangential story about how she was being moved from one room to another room at the extended stay [facility] and she got very overwhelmed.  At one point she told me she felt like her belongings were being touched and moved without her permission but later on she claimed that she didn't feel that way and \"I\"m not hallucinating!\".  She jumps from subject to subject, talks about how people manipulate her because she has a kind heart.  She feels that she is taken advantage of and feels extremely exhausted from this.  She " "started screaming at the top of her lungs \"Oh Lord, help me now, I can't do this anymore!\"  She denied SI.  She claims that she is taking meds but cannot remember which meds she is taking.  When asked what she was taking medications for, the patient says \"they have said I am bipolar but I don't believe them\".  Per chart review she has a history of bipolar disorder, ADHD, PTSD, and cannabis abuse.    She does state that she is very frustrated with her work because they did not allow her a day off for self-care.  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    When she arrived in the emergency department she demanded to see \"only a black doctor, not a Chinese doctor, not a Bermudian doctor, only a black doctor \".    At one point she did point to her left arm and said that if we really wanted to help her we would remove her Implanon implant which she believes is causing her problems.  When asked how long it has been there she says \"years and years \".    Before she escalated to the point of requiring chemical restraint, she did deny any physical symptoms including any fevers, chills, nasal congestion, sore throat, cough, chest pain, abdominal pain, nausea, vomiting, or diarrhea.      I have reviewed the Medications, Allergies, Past Medical and Surgical History, and Social History in the [facility] system.  PAST MEDICAL HISTORY:   Past Medical History:   Diagnosis Date     ADHD (attention deficit hyperactivity disorder)      Bipolar 1 disorder (H)      PTSD (post-traumatic stress disorder)        PAST SURGICAL HISTORY: History reviewed. No pertinent surgical history.    Past medical history, past surgical history, medications, and allergies were reviewed with the patient. Additional pertinent items: None    FAMILY HISTORY:   Family History   Problem Relation Age of Onset     Hypertension " Mother      Cancer Mother      Diabetes Father      Hypertension Maternal Grandmother      Cancer Maternal Grandmother      Diabetes Paternal Grandmother      Diabetes Paternal Grandfather        SOCIAL HISTORY:   Social History     Tobacco Use     Smoking status: Current Every Day Smoker     Packs/day: 1.00     Smokeless tobacco: Never Used   Substance Use Topics     Alcohol use: Yes     Comment: occ     Social history was reviewed with the patient. Additional pertinent items: None      Patient's Medications   New Prescriptions    No medications on file   Previous Medications    HYDROXYZINE (VISTARIL) 25 MG CAPSULE    TAKE 1 CAPSULE BY MOUTH 1 TO 2 TIMES DAILY AS NEEDED FOR ANXIETY. MUST LAST 1 MONTH    LAMOTRIGINE (LAMICTAL PO)    Take 200 mg by mouth    LAMOTRIGINE (LAMICTAL) 100 MG TABLET    TAKE 2 TABLETS BY MOUTH DAILY   Modified Medications    No medications on file   Discontinued Medications    No medications on file          Allergies   Allergen Reactions     Penicillins Rash        Review of Systems   Constitutional: Negative for fever.   HENT: Negative for congestion and sore throat.    Respiratory: Negative for cough and shortness of breath.    Cardiovascular: Negative for chest pain.   Gastrointestinal: Negative for abdominal pain, diarrhea, nausea and vomiting.   Musculoskeletal: Negative for arthralgias.   Skin: Negative for color change.   Neurological: Negative for headaches.   Psychiatric/Behavioral: Positive for agitation, behavioral problems and dysphoric mood. Negative for confusion. The patient is nervous/anxious and is hyperactive.    All other systems reviewed and are negative.    A complete review of systems was performed with pertinent positives and negatives noted in the HPI, and all other systems negative.    Physical Exam   BP: (refused)      Physical Exam  Vitals signs and nursing note reviewed.   Constitutional:       General: She is in acute distress.      Appearance: She is  "well-developed. She is not diaphoretic.      Comments: Adult female, wearing a bedsheet wrapped around her, knotted at the upper chest (wearing sheet as a dress), agitated, screaming, yelling, picking up her belongings (markers and coloring book) and rearranging her room, putting her things from the table to the recliner and back, pacing, agitated   HENT:      Head: Normocephalic and atraumatic.   Eyes:      Conjunctiva/sclera: Conjunctivae normal.      Pupils: Pupils are equal, round, and reactive to light.   Cardiovascular:      Rate and Rhythm: Normal rate and regular rhythm.      Heart sounds: Normal heart sounds.   Pulmonary:      Effort: Pulmonary effort is normal. No respiratory distress.      Breath sounds: Normal breath sounds. No wheezing or rales.   Skin:     General: Skin is warm and dry.   Neurological:      Mental Status: She is alert.   Psychiatric:         Mood and Affect: Affect is labile and angry.         Speech: Speech is tangential.         Behavior: Behavior is agitated and aggressive.         Thought Content: Thought content is paranoid and delusional.         Judgment: Judgment is impulsive.      Comments: Agitated, verbally aggressive, loud, yelling.  Wearing a sheet wrapped around her (arrived naked via EMS, covered by a blanket). Very tangential and disorganized. Flight of ideas noted, having difficulty being redirected or following directions.  Tearful, crying about being taken advantage of because of her \"kind heart\". Guarded and suspicious of medical personnel (allowed very cursory physical exam of lung and heart auscultation) \"I don't want to be touched\"         ED Course        Procedures             Critical care: Based on the patient's presentation, she was an acute danger to herself due to her aggressive and agitated behavior.  She did require chemical restraint which required code 21 to achieve this.  Approximately 60 minutes of critical care time including assessment, " reassessment, record review, discussion with multiple consultants including the BEC , BEC supervisor, and intake as well as documentation.                   Results for orders placed or performed during the hospital encounter of 06/21/20 (from the past 24 hour(s))   Drug abuse screen 6 urine (tox)   Result Value Ref Range    Amphetamine Qual Urine Negative NEG^Negative    Barbiturates Qual Urine Negative NEG^Negative    Benzodiazepine Qual Urine Negative NEG^Negative    Cannabinoids Qual Urine Positive (A) NEG^Negative    Cocaine Qual Urine Negative NEG^Negative    Ethanol Qual Urine Negative NEG^Negative    Opiates Qualitative Urine Negative NEG^Negative   HCG qualitative urine   Result Value Ref Range    HCG Qual Urine Negative NEG^Negative     Medications   naproxen (NAPROSYN) tablet 250 mg (250 mg Oral Not Given 6/21/20 5295)   OLANZapine zydis (zyPREXA) ODT tab 10 mg (10 mg Oral Not Given 6/21/20 4339)   OLANZapine (zyPREXA) injection 10 mg (10 mg Intramuscular Given 6/21/20 0617)             Assessments & Plan (with Medical Decision Making)   Patient presents to the emergency department today for behavioral complaints.  Very tangential and disorganized upon initial evaluation, verbally aggressive and wearing only a sheet.  During and shortly after the interview she pulled the sheet off so she was completely naked.  She would not cooperate with taking oral Zyprexa Zydis so Zyprexa 10 mg IM was ordered, code 21 was necessary to medicate her.  She is currently resting on the mattress and room 14.  The patient is not going to be assessable by the BEC  due to her acutely psychotic state.  I did speak to Lorna Solis, the BEC supervisor who was agreeable to admit the patient without a formal BEC assessment.  Intake was called and given the clinical information.  Patient will be placed on a 72-hour hold and will be admitted at this point for her acute mental health decompensation/psychosis.    I have  reviewed the nursing notes.    I have reviewed the findings, diagnosis, plan and need for follow up with the patient.    New Prescriptions    No medications on file       Final diagnoses:   Psychosis, unspecified psychosis type (H)   Bipolar affective disorder, remission status unspecified (H)       6/21/2020   [facility], [place], EMERGENCY DEPARTMENT     Marta Pereyra MD  06/22/20 0052

## 2020-06-22 NOTE — PROGRESS NOTES
Pt. arrived on the unit at approximately 1150.  Pt. was very agitated on arrival, appearing very paranoid, manic, unable to respond to staff's direction.  Pt.'s behavior continued to escalate, condition green called, followed by a code 21. Pt. was escorted to seclusion and received emergency medication.

## 2020-06-22 NOTE — ED NOTES
"Explained to patient the importance of doing the COVID test, she stated \"no\". This RN then explained the reasoning to do the covid test and how it is performed. Patient then stated \"no\" and waved hand in front of her face. Will attempt again.   "

## 2020-06-22 NOTE — PROGRESS NOTES
Pt. calm in seclusion, laying on the mat.  Pt.able to respond to some simple directions.  Pt. encouraged to come out of seclusion and lay down in her room.  Pt. resting comfortably in her room at this writing.

## 2020-06-22 NOTE — TELEPHONE ENCOUNTER
S: [place] ED seeking IP MH on a 33yo female presenting manic and hallucinating     B: Pt hx of bipolar disorder and PTSD. Pt reports she was living in an extended stay hotel, was picked up by police and brought in to ED naked, holding her head and complaining about loud voices. Pt was wearing her bed sheet as a dress, was disruptive and agitated in ED, pacing. Pt reports she is anxious, denies suicidal ideation. Pt reports she became overwhelmed when moving from one room in the hotel to another. Pt has refused all oral meds in ED. Pt believes that she has an implant she wants removed from her arm. Pt required sedation with zyprexa in ED and is now resting in seclusion. Pt is not currently in restraints. Pt denies acute medical concerns.      A: medically cleared, utox and COVID pending, 72hr emergency hold.   R: ruth/Jono
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
## 2020-06-22 NOTE — PROVIDER NOTIFICATION
Pt. arrived on the unit, extremely agitated, paranoid, manic, yelling at staff. Pt. unable to respond to any de-escalation techniques.  Pt.'s behavior is threatening to staff and other pts.

## 2020-06-22 NOTE — ED NOTES
Bed: ED16A  Expected date:   Expected time:   Means of arrival:   Comments:  [facility] 34f SI

## 2020-06-22 NOTE — ED NOTES
PT states that she wants to leave, she was reminded she was on a 72 hour hold, and she replied with silence. She declines wanting to talk to an  at this time, states she is thinking about the covid test. She has requested multiple crayon colors. She is talking to herself and at staff, but then doesn't want people to invade her privacy.

## 2020-06-22 NOTE — ED NOTES
Code 21 called to help administer medication as well as move patient to a room that had less distractions. Patient prior to being moved to room consistently came out of room yelling, screaming, and crying towards staff. She was not able to be redirected, and she refused to take oral medication. Pt also had taken off her clothing and was not cooperating with wearing clothing at this time. During code patient was told plan and was cooperative, and explanation of why she was given an IM shot was given to patient. She was transferred using a backboard. She is now in her room, door is open, lights are off, and she is currently improved when it comes to yelling. Will continue to monitor patient.

## 2020-06-22 NOTE — ED NOTES
"Pt refusing vital signs, wrapped up in a blanket, removing clothes, demanding a black doctor, \"not a chinese doctor, not a Emirati doctor, only a black doctor!.\"  "

## 2020-06-22 NOTE — PROVIDER NOTIFICATION
Pt. unable to understand due to her mental health status the criteria for coming out of seclusion.  Staff will explain  To the  Pt. The discontinuation criteria when the pt. Is able to cognitively process it.

## 2020-06-22 NOTE — ED NOTES
"Code 21 called as patient refused to take medication by mouth and was consistently going out of her room and was screaming and yelling at staff. She was reminded that she needed to stay in her room, and attempts were made to have little distractions in/outside of room. Pt began to scream and cry, and she was told that if she did not take the oral medication that she would need to have a shot again. At this time she agreed to take the shot without any help from the code 21 team. She went back to her room and demanded to talk to someone about a grievance.     This RN gave her the number for doing a grievance, and attempted to call the grievance people, but they are not in the office at this time. She was told that at 0800 the office opens and she could call then and the card was left with her.     After the code team dispersed, patient began to rip up papers in the room and yell that somebody needed to clean this up. This RN approached her and she said that she was talking to herself and shouted \"Get out of here\". RN did leave the room, and is outside the room with staff. She is currently outside of her room and had brought her bedside table out where she is eating and yelling at staff. She once again asked about the grievance people and was reminded that they do not open up until 0800.     Pt then began to harass one of the male staff, stating that \"you and him must find me so attractive and funny, don't you?\". She was asked if there was anything we could help her with to which she walked away.   "

## 2020-06-22 NOTE — ED NOTES
ED to Behavioral Floor Handoff  ambulance  SITUATION  Ivelisse Castillo is a 34 year old female who speaks English and is homeless unknown The patient arrived in the ED by  from a hotel with a complaint of Anxiety (Police called to extendedstay, pt homeless, recent job loss, anxious and uncooperative when asked to leave the hotel.) and Aggressive Behavior (Refused to leave hotel, became loud and beligerent, 7 police officers called to scene.)  .The patient's current symptoms started/worsened 3 day(s) ago and during this time the symptoms have increased.   In the ED, pt was diagnosed with   Final diagnoses:   Psychosis, unspecified psychosis type (H)   Bipolar affective disorder, remission status unspecified (H)        Initial vitals were: BP: (refused)  Resp: 18   --------  Is the patient diabetic? No   If yes, last blood glucose? --     If yes, was this treated in the ED? --  --------  Is the patient inebriated (ETOH) No or Impaired on other substances? No  MSSA done? N/A  Last MSSA score: --    Were withdrawal symptoms treated? N/A  Does the patient have a seizure history? No. If yes, date of most recent seizure--  --------  Is the patient patient experiencing suicidal ideation? denies current or recent suicidal ideation     Homicidal ideation? denies current or recent homicidal ideation or behaviors.    Self-injurious behavior/urges? denies current or recent self injurious behavior or ideation.  ------  Was pt aggressive in the ED Yes  Was a code called Yes to give medications  Is the pt now cooperative? Yes but needing redirection  -------  Meds given in ED:   Medications   naproxen (NAPROSYN) tablet 250 mg (250 mg Oral Not Given 6/21/20 5285)   naproxen (NAPROSYN) tablet 500 mg (500 mg Oral Not Given 6/22/20 6312)   OLANZapine (zyPREXA) injection 10 mg (10 mg Intramuscular Given 6/21/20 2517)   OLANZapine (zyPREXA) injection 10 mg (10 mg Intramuscular Given 6/22/20 6138)      Family present during ED course?  No  Family currently present? No    BACKGROUND  Does the patient have a cognitive impairment or developmental disability? No  Allergies:   Allergies   Allergen Reactions     Penicillins Rash   .   Social demographics are   Social History     Socioeconomic History     Marital status: Single     Spouse name: None     Number of children: None     Years of education: None     Highest education level: None   Occupational History     None   Social Needs     Financial resource strain: None     Food insecurity     Worry: None     Inability: None     Transportation needs     Medical: None     Non-medical: None   Tobacco Use     Smoking status: Current Every Day Smoker     Packs/day: 1.00     Smokeless tobacco: Never Used   Substance and Sexual Activity     Alcohol use: Yes     Comment: occ     Drug use: Yes     Types: Marijuana     Sexual activity: Not Currently   Lifestyle     Physical activity     Days per week: None     Minutes per session: None     Stress: None   Relationships     Social connections     Talks on phone: None     Gets together: None     Attends Tenriism service: None     Active member of club or organization: None     Attends meetings of clubs or organizations: None     Relationship status: None     Intimate partner violence     Fear of current or ex partner: None     Emotionally abused: None     Physically abused: None     Forced sexual activity: None   Other Topics Concern     None   Social History Narrative     None        ASSESSMENT  Labs results   Labs Ordered and Resulted from Time of ED Arrival Up to the Time of Departure from the ED   DRUG ABUSE SCREEN 6 CHEM DEP URINE ([facility]) - Abnormal; Notable for the following components:       Result Value    Cannabinoids Qual Urine Positive (*)     All other components within normal limits   HCG QUALITATIVE URINE   COVID-19 VIRUS (CORONAVIRUS) BY PCR   SARS-COV-2 (COVID-19) VIRUS RT-PCR      Imaging Studies: No results found for this or any previous visit  (from the past 24 hour(s)).   Most recent vital signs Resp 18    Abnormal labs/tests/findings requiring intervention:---   Pain control: fair, pt asked for mentral cramp meds but then refused medication  Nausea control: pt had none    RECOMMENDATION  Are any infection precautions needed (MRSA, VRE, etc.)? No If yes, what infection? --  ---  Does the patient have mobility issues? independently. If yes, what device does the pt use? ---  ---  Is patient on 72 hour hold or commitment? Yes If on 72 hour hold, have hold and rights been given to patient? Yes  Are admitting orders written if after 10 p.m. ?N/A  Tasks needing to be completed:---     Alana Lopes RN   [facility]-- [id number] [phone] [facility]   [phone] [facility]

## 2020-06-22 NOTE — TELEPHONE ENCOUNTER
S: [place] ED seeking IP MH on a 35yo female presenting manic and hallucinating    B: Pt hx of bipolar disorder and PTSD. Pt reports she was living in an extended stay hotel, was picked up by police and brought in to ED naked, holding her head and complaining about loud voices. Pt was wearing her bed sheet as a dress, was disruptive and agitated in ED, pacing. Pt reports she is anxious, denies suicidal ideation. Pt reports she became overwhelmed when moving from one room in the hotel to another. Pt has refused all oral meds in ED. Pt believes that she has an implant she wants removed from her arm. Pt required sedation with zyprexa in ED and is now resting in seclusion. Pt is not currently in restraints. Pt denies acute medical concerns.     A: medically cleared, utox and COVID pending, 72hr emergency hold.     R: Patient cleared and ready for behavioral bed placement: Yes  No appropriate bed available, Pt has been added to the waitlist.

## 2020-06-22 NOTE — ED NOTES
Increasingly anxious and agitated again while awaiting bed placement. Will give second dose of olanzapine.     Gigi Montana MD  06/22/20 6545

## 2020-06-22 NOTE — ED NOTES
"Pt refused oral Olanzapine, states she has \"My own way of working my anxiety out.\"  Pt having emesis in bag than throwing it on floor.  Requesting medication for anxiety than refuses.  Pt stated would allow covid when nurse approached refused test.  "

## 2020-06-22 NOTE — ED NOTES
"Pt walking around room area, stating \"I am filing a grievance, you cannot hold me like this. I need a clock and the remote.\" Writer offered breakfast-pt declined stating \"I don't eat hospital food, I only want water.\"  Roro from pt relations called, gave pt the phone, then pt came out of the room and said that \"Roro hung up.\"  "

## 2020-06-22 NOTE — ED NOTES
This RN unable to get vital signs and finish triage due to patient not being cooperative and ripping off blood pressure cuff, oximeter probe, and yelling at staff. Will attempt again when patient is calm and cooperative.

## 2020-06-22 NOTE — ED NOTES
"Pt has requested midol for the second time tonight due to menstrual cramps ( pt refused medication earlier), upon getting the order and coming back to patient she refused again stating \"I don't want your meds, I am not taking anything from you\". This RN stated that was fine, and asked if we could do a covid test at this time, and she agreed. COVID test was performed. She currently is in her room and has asked for the lights to be off, which was done. Staff outside of room.   "

## 2020-06-23 LAB
ALBUMIN SERPL-MCNC: 2.9 G/DL (ref 3.4–5)
ALP SERPL-CCNC: 96 U/L (ref 40–150)
ALT SERPL W P-5'-P-CCNC: 27 U/L (ref 0–50)
ANION GAP SERPL CALCULATED.3IONS-SCNC: 9 MMOL/L (ref 3–14)
AST SERPL W P-5'-P-CCNC: 27 U/L (ref 0–45)
BASOPHILS # BLD AUTO: 0 10E9/L (ref 0–0.2)
BASOPHILS NFR BLD AUTO: 0.4 %
BILIRUB SERPL-MCNC: 0.4 MG/DL (ref 0.2–1.3)
BUN SERPL-MCNC: 9 MG/DL (ref 7–30)
CALCIUM SERPL-MCNC: 8.5 MG/DL (ref 8.5–10.1)
CHLORIDE SERPL-SCNC: 113 MMOL/L (ref 94–109)
CHOLEST SERPL-MCNC: 149 MG/DL
CO2 SERPL-SCNC: 22 MMOL/L (ref 20–32)
CREAT SERPL-MCNC: 0.88 MG/DL (ref 0.52–1.04)
DIFFERENTIAL METHOD BLD: NORMAL
EOSINOPHIL # BLD AUTO: 0.1 10E9/L (ref 0–0.7)
EOSINOPHIL NFR BLD AUTO: 3.1 %
ERYTHROCYTE [DISTWIDTH] IN BLOOD BY AUTOMATED COUNT: 14.6 % (ref 10–15)
GFR SERPL CREATININE-BSD FRML MDRD: 84 ML/MIN/{1.73_M2}
GLUCOSE SERPL-MCNC: 120 MG/DL (ref 70–99)
HCT VFR BLD AUTO: 38.8 % (ref 35–47)
HDLC SERPL-MCNC: 53 MG/DL
HGB BLD-MCNC: 12.5 G/DL (ref 11.7–15.7)
IMM GRANULOCYTES # BLD: 0 10E9/L (ref 0–0.4)
IMM GRANULOCYTES NFR BLD: 0.2 %
LDLC SERPL CALC-MCNC: 79 MG/DL
LYMPHOCYTES # BLD AUTO: 1.8 10E9/L (ref 0.8–5.3)
LYMPHOCYTES NFR BLD AUTO: 40.9 %
MCH RBC QN AUTO: 28.8 PG (ref 26.5–33)
MCHC RBC AUTO-ENTMCNC: 32.2 G/DL (ref 31.5–36.5)
MCV RBC AUTO: 89 FL (ref 78–100)
MONOCYTES # BLD AUTO: 0.5 10E9/L (ref 0–1.3)
MONOCYTES NFR BLD AUTO: 10.7 %
NEUTROPHILS # BLD AUTO: 2 10E9/L (ref 1.6–8.3)
NEUTROPHILS NFR BLD AUTO: 44.7 %
NONHDLC SERPL-MCNC: 96 MG/DL
NRBC # BLD AUTO: 0 10*3/UL
NRBC BLD AUTO-RTO: 0 /100
PLATELET # BLD AUTO: 209 10E9/L (ref 150–450)
POTASSIUM SERPL-SCNC: 3.5 MMOL/L (ref 3.4–5.3)
PROT SERPL-MCNC: 6.6 G/DL (ref 6.8–8.8)
RBC # BLD AUTO: 4.34 10E12/L (ref 3.8–5.2)
SODIUM SERPL-SCNC: 144 MMOL/L (ref 133–144)
TRIGL SERPL-MCNC: 86 MG/DL
TSH SERPL DL<=0.005 MIU/L-ACNC: 2.61 MU/L (ref 0.4–4)
WBC # BLD AUTO: 4.5 10E9/L (ref 4–11)

## 2020-06-23 PROCEDURE — 80053 COMPREHEN METABOLIC PANEL: CPT | Performed by: NURSE PRACTITIONER

## 2020-06-23 PROCEDURE — 25000132 ZZH RX MED GY IP 250 OP 250 PS 637: Mod: GY | Performed by: NURSE PRACTITIONER

## 2020-06-23 PROCEDURE — 36415 COLL VENOUS BLD VENIPUNCTURE: CPT | Performed by: NURSE PRACTITIONER

## 2020-06-23 PROCEDURE — 85025 COMPLETE CBC W/AUTO DIFF WBC: CPT | Performed by: NURSE PRACTITIONER

## 2020-06-23 PROCEDURE — 84443 ASSAY THYROID STIM HORMONE: CPT | Performed by: NURSE PRACTITIONER

## 2020-06-23 PROCEDURE — 99232 SBSQ HOSP IP/OBS MODERATE 35: CPT | Mod: 95 | Performed by: NURSE PRACTITIONER

## 2020-06-23 PROCEDURE — 12400001 ZZH R&B MH UMMC

## 2020-06-23 PROCEDURE — G0177 OPPS/PHP; TRAIN & EDUC SERV: HCPCS

## 2020-06-23 PROCEDURE — 80061 LIPID PANEL: CPT | Performed by: NURSE PRACTITIONER

## 2020-06-23 RX ORDER — HYDROXYZINE HYDROCHLORIDE 50 MG/1
50 TABLET, FILM COATED ORAL EVERY 4 HOURS PRN
Status: DISCONTINUED | OUTPATIENT
Start: 2020-06-23 | End: 2020-06-24

## 2020-06-23 RX ORDER — TOPIRAMATE 100 MG/1
100 TABLET, FILM COATED ORAL DAILY
Status: ON HOLD | COMMUNITY
End: 2020-07-06

## 2020-06-23 RX ORDER — TOPIRAMATE 100 MG/1
100 TABLET, FILM COATED ORAL DAILY
Status: DISCONTINUED | OUTPATIENT
Start: 2020-06-23 | End: 2020-07-06 | Stop reason: HOSPADM

## 2020-06-23 RX ORDER — OLANZAPINE 10 MG/1
10 TABLET ORAL AT BEDTIME
Status: DISCONTINUED | OUTPATIENT
Start: 2020-06-23 | End: 2020-06-24

## 2020-06-23 RX ORDER — OLANZAPINE 10 MG/1
5-10 TABLET ORAL 2 TIMES DAILY PRN
Status: ON HOLD | COMMUNITY
End: 2020-07-06

## 2020-06-23 RX ORDER — DULOXETIN HYDROCHLORIDE 30 MG/1
30 CAPSULE, DELAYED RELEASE ORAL DAILY
Status: ON HOLD | COMMUNITY
End: 2020-07-06

## 2020-06-23 RX ADMIN — OLANZAPINE 10 MG: 10 TABLET, FILM COATED ORAL at 21:37

## 2020-06-23 RX ADMIN — HYDROXYZINE HYDROCHLORIDE 50 MG: 50 TABLET, FILM COATED ORAL at 22:17

## 2020-06-23 RX ADMIN — TOPIRAMATE 100 MG: 100 TABLET, FILM COATED ORAL at 11:07

## 2020-06-23 RX ADMIN — HYDROXYZINE HYDROCHLORIDE 25 MG: 25 TABLET, FILM COATED ORAL at 08:30

## 2020-06-23 RX ADMIN — OLANZAPINE 10 MG: 10 TABLET, FILM COATED ORAL at 10:14

## 2020-06-23 ASSESSMENT — ACTIVITIES OF DAILY LIVING (ADL)
LAUNDRY: WITH SUPERVISION
HYGIENE/GROOMING: INDEPENDENT
ORAL_HYGIENE: INDEPENDENT
DRESS: INDEPENDENT

## 2020-06-23 NOTE — PROGRESS NOTES
This CTC placed call to [facility] Screening at [phone] and spoke to Roro.  Informed of case.  Emailed documents and coordinated with station 30 for other assistance.

## 2020-06-23 NOTE — PLAN OF CARE
Pt was isolative to room throughout shift, sleeping and lying in bed. Affect was blunted, flat, sad. Mood was irritable, anxious. Pt was tearful when staff and NP attempted to talk to her. She stated that she didn't want to come out of her room because she didn't trust the staff. Unable to do check in or admission profile interview d/t pt's somnolence and mostly incoherent responses to staff questions. Will continue to monitor.

## 2020-06-23 NOTE — PROGRESS NOTES
Pt irritable with this writer and displays disorganization in thought process, needing information repeated several times. In addition, displays confusion related to medications she was taking outside of the hospital. After an incidence of verbal agitation and yelling, pt has been generally calm on the unit.     Per request for anxiety, pt given 25 mg hydroxyzine prn. Will continue to assess and offer support.

## 2020-06-23 NOTE — PROGRESS NOTES
Attended 1 OT group. Will be given a self assessment form. OT staff will explain there value of OT, including them in their tx plan and offer options for meeting their needs and identifying goals.  Participated in positive affirmations group/activity. Contributed to discussion and activity- able to identify personal positive words/affirmations to encourage and motivate self. Made a card with personal words as a reminder and to reinforce use.  Will continue to encourage and support active participation in groups for improved self awareness and active implementation of healthy coping skills for improved self management.

## 2020-06-23 NOTE — PLAN OF CARE
BEHAVIORAL TEAM DISCUSSION    Participants: Joie Zamorano CNP; Asia Alvarez CTC: Escobar Jorgensen OT; Sari Esposito and Salima Perez RN  Progress: new admission for 34 year old female who was brought to the ED via EMS due to agitation and psychosis.  She was placed on a 72HH.  Anticipated length of stay: TBD  Continued Stay Criteria/Rationale: pt needs further assessment and is not stable at this time  Medical/Physical: uneventful  Precautions:   Behavioral Orders   Procedures     Assault precautions     Code 1 - Restrict to Unit     Elopement precautions     Routine Programming     As clinically indicated     Sexual precautions     Single Room     Status 15     Every 15 minutes.     Plan: assess, monitor and stabilize.  Petition for commitment  Rationale for change in precautions or plan: new admission and impaired judgement

## 2020-06-23 NOTE — PROGRESS NOTES
[email or web address]  Has been assigned to the case.   Faxed her additional paperwork at her request.    Let her know this CTC out of office for the remainder of the week.  Linked an email for her to other CTC on 32 for continuity of care.  Dianne has direct number for unit

## 2020-06-23 NOTE — PROGRESS NOTES
"Patient in lounge and disrupted group with tearfulness and c/o \"anxiety\" with observed agitation while stating \"I am disorganized.\" PRN zyprexa offered/accepted.    Patient concerns include belongings at a shelter, insurance and therpist/psychiatry appts.    Patient is yelling \"should not have been restrained before coming here...\"    Nursing will continue to monitor.  "

## 2020-06-23 NOTE — PROGRESS NOTES
"  Pt was irritable for the first half of this shift , crying , yelling and pacing back and forth out in the lounge . Pt states her major stressor is being homeless and not having a good support system. Pt is upset due to getting restraint , states it brought back lots of trauma. Pt called human relation and left a message for someone to call her back in regards to getting restraint. Pt denies SI/SIB thoughts and HI, expressed not wanting to get committed \"I have been taking my medication, they are trying to commit me \". Pt did attend groups and was visible was meals. No further concerns .         06/23/20 1505   Behavioral Health   Hallucinations denies / not responding to hallucinations   Thinking poor concentration   Orientation person: oriented;place: oriented;date: oriented;time: oriented   Memory baseline memory   Insight denial of illness;poor   Judgement impaired   Eye Contact at examiner   Affect blunted, flat;irritable   Mood anxious;labile;irritable   Physical Appearance/Attire neat   Hygiene well groomed   Suicidality other (see comments)  (pt denies)   1. Wish to be Dead (Recent) No   Wish to be Dead Description (Recent)   (Pt denies)   2. Non-Specific Active Suicidal Thoughts (Recent) No   Non-Specific Active Suicidal Thought Description (Recent)   (none stated observed. )   Self Injury other (see comment)  (none stated or observed. )   Activity hyperactive (agitated, impulsive);restless   Speech clear;coherent   Medication Sensitivity no stated side effects;no observed side effects   Psychomotor / Gait balanced;steady   Psycho Education   Type of Intervention 1:1 intervention   Response participates, initiates socially appropriate   Hours 0.5   Treatment Detail   (1;1 check in )   Activities of Daily Living   Hygiene/Grooming independent   Oral Hygiene independent   Dress independent   Laundry with supervision   Room Organization independent     "

## 2020-06-23 NOTE — PROGRESS NOTES
"  INITIAL PSYCHOSOCIAL ASSESSMENT AND NOTE  I have reviewed the chart, attended the team discussion and developed Care Plan.  Information for assessment was obtained from: chart review and collaboration with staff on the unit given pt is unable to participate in the interview.  PRESENTING PROBLEM:    Per chart review: Pt is a \"34 year old female who has a hx of commitment and over ten inpatient mental health hospitalizations.    admitted to [facility] on 6/21/2020, arriving on the unit 6/22/2020.  She was admitted on a 72-hour hold through the ER, brought in via EMS due to agitation and psychosis.  Police were called to her extended stay hotel due to her causing a disturbance.  She refused to leave, necessitating the presence of 7 police officers.  She was naked at the time and covered in a blanket.  EMS reported she was holding her head and reported hearing loud voices.  In the ER she was extremely agitated, yelling, rearranging objects in the room and pacing.  She vomited in a bag and threw it on the floor.  She made sexually inappropriate comments to male staff.  At least two code 21s were called in the ER and she received IM Zyprexa\"    She required seclusion immediately upon arriving on the unit.  She refused to sign in voluntarily for treatment when she was clearly unable to care for herself.  The following areas have been assessed:  History of Mental Health and Chemical Dependency:   Prior mental health history gleaned from [facility]:  \"She arrived at [facility] on 1/18/2020 and met with a crisis SW who noted:  \"Crisis SW met with patient as requested by ED MD to provide mental health resources. Patient expressed feeling anxious and having loud thoughts. She reports symptoms started on 1/6/2020 when she was started on a new medication. Patient reports having a psychiatric provider and therapist at [facility], with her next appointment being on 1/23/2020. Patient also shares that she had a " "[facility]  who she got rid of because she was not getting along with that person, so she is working with the [facility] to be reassigned to a new . Patient discussed stressors related to losing work and housing over the last month. Patient reports staying in shelter in [place]\"  Last inpatient admission appears to have been 2014.  Pt appears to have decompensated since January.    She carries a bipolar type 1 dx.  She admits to daily cannabis use.  She was diagnosed with bipolar at age 20.   Hx of ten inpatient mental health admissions.  Hx of group home placements.  Hx of commitment in 2007  No hx of CD treatment  Living Situation: per ED notes, pt has been staying in an extended hotel in [place] and was asked to leave.  Prior to that she was staying in [facility].  Significant Life Events (Illness, Abuse, Trauma, Death): immigrated to the [place] at age 12 from [place], born in [place]  Family Description (Constellation, Family Psychiatric History): Pt has siblings who appear to remain in [place].  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    Educational Background: unknown     Service History: presumed none  Legal Issues: unknown  Ethnic/Cultural Issues: AA female with immigration hx  Spiritual Orientation: unknown  Social Functioning (organizations, interests): unable to assess    Current Treatment providers:   Psychiatry - Polo Adams PA-C - [facility]  Social Service Assessment/Plan:   Patient will have psychiatric assessment and medication management by psychiatry. The treatment team will continue to assess and stabilize the patient's mental health symptoms with the use of medications and therapeutic programming. Hospital staff will provide a safe environment and a therapeutic milieu. Staff will continue to assess patient as " needed. Patient will be encouraged to participate in unit groups and activities. Patient will receive individual and group support on the unit. CTC will do individual inpatient treatment planning and after care planning. CTC will discuss options for increasing community supports with the patient. CTC will coordinate with outpatient providers and will place referrals to ensure appropriate follow up care is in place.

## 2020-06-23 NOTE — PHARMACY-ADMISSION MEDICATION HISTORY
Admission medication history for the June 23, 2020 admission is complete.     Interview Sources:  Patient, [facility] in [place] at [phone]    Reliability of Source: Good    Medication Adherence: Unable to assess    Current Outpatient Pharmacy: [facility] in [place] at [phone]    Changes made to PTA medication list (reason)  Added: Topamax, Cymbalta, Zypexa  Deleted: hydroxyzine, Lamictal, Nicorette gum  Changed: none    Additional medication history information:   The patient was able to report she fills at [facility] but she was not feeling well so was not able to discuss medications further. The pharmacist at [facility] reported she filled Cymbalta and Topamax on 05/17/20 and Zyprexa in 03/2020.    Prior to Admission Medication List:  Prior to Admission medications    Medication Sig Last Dose Taking? Auth Provider   DULoxetine (CYMBALTA) 30 MG capsule Take 30 mg by mouth daily  Yes Unknown, Entered By History   OLANZapine (ZYPREXA) 10 MG tablet Take 5-10 mg by mouth 2 times daily as needed (symptoms of chaka or aggression)  Yes Unknown, Entered By History   topiramate (TOPAMAX) 100 MG tablet Take 100 mg by mouth daily  Yes Unknown, Entered By History       Time spent: 30 minutes    Medication history completed by:   Rosalva Santos, PharmD, BCPP  Behavioral Health Pharmacist  [facility] ([facility])  [facility] Ascom: *[phone] or *[phone]  [facility] Phone: [phone]

## 2020-06-23 NOTE — PROGRESS NOTES
[facility]   Psychiatric Progress Note      Impression:     Ivelisse Castillo is a 34-year-old female admitted to [facility] on 6/21/2020, arriving on the unit 6/22/2020.  She was admitted on a 72-hour hold through the ER, brought in via EMS due to agitation and psychosis.  Police were called to her extended stay hotel due to her causing a disturbance.  She refused to leave, necessitating the presence of 7 police officers.  She was naked at the time and covered in a blanket.  EMS reported she was holding her head and reported hearing loud voices.  In the ER she was extremely agitated, yelling, rearranging objects in the room and pacing.  She vomited in a bag and threw it on the floor.  She made sexually inappropriate comments to male staff.  At least two code 21s were called in the ER and she received IM Zyprexa 10 mg x 2.   She stated she was nonadherent to Lamictal and had been occasionally taking PRN Hydroxyzine prior to admission.  However her pharmacy later indicated that she is only prescribed Cymbalta, Topamax and PRN Zyprexa, and hadn't filled Zyprexa since March.  Upon admission to the unit she was highly agitated, yelling, and was escorted to seclusion and given IM Haldol-Ativan-Benadryl.  Interview was attempted approximately 3.5 hours later.  She was lying in bed, very somnolent, appeared to be in emotional distress, and provided minimal information.  UTOX was positive for cannabinoids and she reports daily use.  Since admission, Topamax was continued.  Cymbalta was discontinued.   PRN Zyprexa was continued.  PRNs of Haldol-Ativan-Benadryl, Hydroxyzine and Trazodone were initiated.   She has been guarded and irritable during interactions with staff.  A petition for commitment MI with Charles was filed in [facility].         Diagnoses:     Bipolar disorder type 1, manic, severe with psychosis  Cannabis use disorder         Plan:     Medications:   Restart Topamax 100 mg daily.  Schedule Zyprexa 10 mg at HS.  Continue PRNs of Haldol-Ativan-Benadryl, Zyprexa and Trazodone.  Increase PRN Hydroxyzine to 50 mg.  Requested Charles meds include Haldol, Geodon, Latuda, Abilify, Seroquel and Zyprexa.  Of note, records indicate Risperdal was not beneficial and caused irritability.      Petition for commitment MI with [facility] in [facility].  She is homeless.  She had been living in an extended stay hotel in [place].  Disposition to be determined pending the outcome of court.  She has outpatient psychiatry.         Telemedicine Visit: The patient's condition can be safely assessed and treated via synchronous audio and visual telemedicine encounter.       Start Time: 0907   Stop Time: 0925     Reason for Telemedicine Visit: COVID-19 precautions     Originating Site (Patient Location): [facility]     Distant Site (Provider Location): Provider Remote Setting     Consent:  The patient/guardian has verbally consented to: the potential risks and benefits of telemedicine (video visit) versus in person care; bill my insurance or make self-payment for services provided; and responsibility for payment of non-covered services.      Mode of Communication:  Video Conference via [employer]     As the provider I attest to compliance with applicable laws and regulations related to telemedicine.      Attestation:  Patient has been seen and evaluated by me, Anabell Zamorano, APRN CNP  The patient was counseled on nature of illness and treatment plan/options  Care was coordinated with treatment team         Clinical Global Impressions  First:  Considering your total clinical experience with this particular patient population, how severe are the patient's symptoms at this time?: 7 (06/22/20 1205)  Compared to the patient's condition at the START of treatment, this patient's condition is: 4 (06/22/20 1205)  Most recent:  Considering your total clinical experience with  "this particular patient population, how severe are the patient's symptoms at this time?: 7 (06/22/20 1205)  Compared to the patient's condition at the START of treatment, this patient's condition is: 4 (06/22/20 1205)            Interim History:     The patient's care was discussed with the treatment team and chart notes were reviewed.  Staff report that pt was very somnolent throughout the remainder of the day yesterday after receiving IM Haldol, Ativan and Benadryl in the early afternoon.  She spent the rest of the day in her room, stating she didn't want to leave because she doesn't trust staff.  She was tearful and spoke rather incoherently.  She was documented as sleeping 7.5 hours during the overnight shift.  This morning, pt was irritable and guarded.  With regard to the reason for admission, she said, \"I was anxious and trying to cope with myself.  I picked flowers from the tree and was smelling flowers to cope\" which resulted in hotel staff or guests calling police.  She says her mood is irritable.  She said, \"I know I'm manic.  I was agitated and screaming because I asked not to be restrained.  It was very traumatic for me.  It's okay to have reactions like this when you're in a homeless shelter.\"  Pt clarified that she had been staying with friends and in her car and was staying in a hotel in [place] for a week prior to admission.  Pt provided highly variable accounts of her medications compared to yesterday, and even within today's conversation.  She said she is taking Topamax 100 mg daily, PRN Hydroxyzine, Lamictal and a \"blue and white pill.\"  She initially said she was prescribed Zyprexa and then said she would not take it.  Pt was very clear that \"all of my current meds are working\" and that she does not need any medication changes other than the addition of Ativan, which she states she will not take in conjunction with Haldol & Benadryl.  Her pharmacy later indicated that her account of Topamax is " correct, and that she is also prescribed Cymbalta, both of which she was regularly filling.  They had no recent records of Lamictal or Hydroxyzine.  They indicated PRN Zyprexa was last filled in March.  She denies suicidal and homicidal ideation.  She denies all psychotic symptoms.  Pt refused to sign in voluntarily.  Informed pt of the petition for commitment.  She did not appear to fully process the meaning of this and terminated the conversation shortly thereafter.            Medications:     Current Facility-Administered Medications   Medication     alum & mag hydroxide-simethicone (MAALOX  ES) suspension 30 mL     diphenhydrAMINE (BENADRYL) capsule 50 mg    Or     diphenhydrAMINE (BENADRYL) injection 50 mg     haloperidol (HALDOL) tablet 5 mg    Or     haloperidol lactate (HALDOL) injection 5 mg     hydrOXYzine (ATARAX) tablet 50 mg     ibuprofen (ADVIL/MOTRIN) tablet 600 mg     LORazepam (ATIVAN) tablet 1-2 mg    Or     LORazepam (ATIVAN) injection 1-2 mg     magnesium hydroxide (MILK OF MAGNESIA) suspension 30 mL     OLANZapine (zyPREXA) tablet 10 mg    Or     OLANZapine (zyPREXA) injection 10 mg     OLANZapine (zyPREXA) tablet 10 mg     topiramate (TOPAMAX) tablet 100 mg     traZODone (DESYREL) tablet 50 mg             Allergies:     Allergies   Allergen Reactions     Penicillins Rash     Tylenol [Acetaminophen] Rash            Psychiatric Examination:     BP (!) 126/97   Pulse 91   Temp 98.2  F (36.8  C) (Oral)   Resp 18   SpO2 99%     Appearance:  awake, alert, dressed in scrubs  Attitude:  guarded, evasive  Eye Contact:  intense at times  Mood:  irritable  Affect:  intensity is heightened  Speech:  clear, coherent, speaks with accent  Psychomotor Behavior:  no evidence of tardive dyskinesia, dystonia, or tics  Thought Process:  disorganized  Associations:  no loose associations  Thought Content:  denies suicidal and homicidal ideation, denies all psychotic symptoms but is very guarded so difficult to  assess  Insight:  limited  Judgment:  poor  Oriented to:  person, place, time, date  Attention Span and Concentration:  limited  Recent and Remote Memory:  quite limited  Language:  intact, fluent English  Fund of Knowledge:  normal  Muscle Strength and Tone: normal  Gait and Station:  normal         Labs:     Recent Results (from the past 24 hour(s))   CBC with platelets differential    Collection Time: 06/23/20  7:34 AM   Result Value Ref Range    WBC 4.5 4.0 - 11.0 10e9/L    RBC Count 4.34 3.8 - 5.2 10e12/L    Hemoglobin 12.5 11.7 - 15.7 g/dL    Hematocrit 38.8 35.0 - 47.0 %    MCV 89 78 - 100 fl    MCH 28.8 26.5 - 33.0 pg    MCHC 32.2 31.5 - 36.5 g/dL    RDW 14.6 10.0 - 15.0 %    Platelet Count 209 150 - 450 10e9/L    Diff Method Automated Method     % Neutrophils 44.7 %    % Lymphocytes 40.9 %    % Monocytes 10.7 %    % Eosinophils 3.1 %    % Basophils 0.4 %    % Immature Granulocytes 0.2 %    Nucleated RBCs 0 0 /100    Absolute Neutrophil 2.0 1.6 - 8.3 10e9/L    Absolute Lymphocytes 1.8 0.8 - 5.3 10e9/L    Absolute Monocytes 0.5 0.0 - 1.3 10e9/L    Absolute Eosinophils 0.1 0.0 - 0.7 10e9/L    Absolute Basophils 0.0 0.0 - 0.2 10e9/L    Abs Immature Granulocytes 0.0 0 - 0.4 10e9/L    Absolute Nucleated RBC 0.0    Comprehensive metabolic panel    Collection Time: 06/23/20  7:34 AM   Result Value Ref Range    Sodium 144 133 - 144 mmol/L    Potassium 3.5 3.4 - 5.3 mmol/L    Chloride 113 (H) 94 - 109 mmol/L    Carbon Dioxide 22 20 - 32 mmol/L    Anion Gap 9 3 - 14 mmol/L    Glucose 120 (H) 70 - 99 mg/dL    Urea Nitrogen 9 7 - 30 mg/dL    Creatinine 0.88 0.52 - 1.04 mg/dL    GFR Estimate 84 >60 mL/min/[1.73_m2]    GFR Estimate If Black >90 >60 mL/min/[1.73_m2]    Calcium 8.5 8.5 - 10.1 mg/dL    Bilirubin Total 0.4 0.2 - 1.3 mg/dL    Albumin 2.9 (L) 3.4 - 5.0 g/dL    Protein Total 6.6 (L) 6.8 - 8.8 g/dL    Alkaline Phosphatase 96 40 - 150 U/L    ALT 27 0 - 50 U/L    AST 27 0 - 45 U/L   TSH with free T4 reflex     Collection Time: 06/23/20  7:34 AM   Result Value Ref Range    TSH 2.61 0.40 - 4.00 mU/L   Lipid panel reflex to direct LDL    Collection Time: 06/23/20  7:34 AM   Result Value Ref Range    Cholesterol 149 <200 mg/dL    Triglycerides 86 <150 mg/dL    HDL Cholesterol 53 >49 mg/dL    LDL Cholesterol Calculated 79 <100 mg/dL    Non HDL Cholesterol 96 <130 mg/dL

## 2020-06-24 PROCEDURE — 99233 SBSQ HOSP IP/OBS HIGH 50: CPT | Mod: 95 | Performed by: PSYCHIATRY & NEUROLOGY

## 2020-06-24 PROCEDURE — 12400001 ZZH R&B MH UMMC

## 2020-06-24 PROCEDURE — 25000132 ZZH RX MED GY IP 250 OP 250 PS 637: Mod: GY | Performed by: NURSE PRACTITIONER

## 2020-06-24 PROCEDURE — 25000132 ZZH RX MED GY IP 250 OP 250 PS 637: Mod: GY | Performed by: PSYCHIATRY & NEUROLOGY

## 2020-06-24 PROCEDURE — G0177 OPPS/PHP; TRAIN & EDUC SERV: HCPCS

## 2020-06-24 RX ORDER — LORAZEPAM 1 MG/1
1 TABLET ORAL EVERY 4 HOURS PRN
Status: DISCONTINUED | OUTPATIENT
Start: 2020-06-24 | End: 2020-07-03

## 2020-06-24 RX ORDER — HYDROXYZINE HYDROCHLORIDE 25 MG/1
25-50 TABLET, FILM COATED ORAL EVERY 4 HOURS PRN
Status: DISCONTINUED | OUTPATIENT
Start: 2020-06-24 | End: 2020-07-06 | Stop reason: HOSPADM

## 2020-06-24 RX ORDER — QUETIAPINE FUMARATE 100 MG/1
100 TABLET, FILM COATED ORAL
Status: DISCONTINUED | OUTPATIENT
Start: 2020-06-24 | End: 2020-07-06 | Stop reason: HOSPADM

## 2020-06-24 RX ORDER — HALOPERIDOL 5 MG/ML
5 INJECTION INTRAMUSCULAR EVERY 4 HOURS PRN
Status: DISCONTINUED | OUTPATIENT
Start: 2020-06-24 | End: 2020-07-06 | Stop reason: HOSPADM

## 2020-06-24 RX ORDER — HALOPERIDOL 5 MG/1
5 TABLET ORAL EVERY 4 HOURS PRN
Status: DISCONTINUED | OUTPATIENT
Start: 2020-06-24 | End: 2020-07-06 | Stop reason: HOSPADM

## 2020-06-24 RX ORDER — OLANZAPINE 10 MG/2ML
10 INJECTION, POWDER, FOR SOLUTION INTRAMUSCULAR 3 TIMES DAILY PRN
Status: DISCONTINUED | OUTPATIENT
Start: 2020-06-24 | End: 2020-06-24

## 2020-06-24 RX ORDER — LANOLIN ALCOHOL/MO/W.PET/CERES
3 CREAM (GRAM) TOPICAL AT BEDTIME
Status: DISCONTINUED | OUTPATIENT
Start: 2020-06-24 | End: 2020-07-06 | Stop reason: HOSPADM

## 2020-06-24 RX ORDER — LORAZEPAM 2 MG/ML
1 INJECTION INTRAMUSCULAR EVERY 4 HOURS PRN
Status: DISCONTINUED | OUTPATIENT
Start: 2020-06-24 | End: 2020-07-03

## 2020-06-24 RX ORDER — OLANZAPINE 10 MG/1
10 TABLET ORAL 3 TIMES DAILY PRN
Status: DISCONTINUED | OUTPATIENT
Start: 2020-06-24 | End: 2020-06-24

## 2020-06-24 RX ORDER — LANOLIN ALCOHOL/MO/W.PET/CERES
3 CREAM (GRAM) TOPICAL
Status: DISCONTINUED | OUTPATIENT
Start: 2020-06-24 | End: 2020-06-24

## 2020-06-24 RX ORDER — DIPHENHYDRAMINE HYDROCHLORIDE 50 MG/ML
50 INJECTION INTRAMUSCULAR; INTRAVENOUS EVERY 4 HOURS PRN
Status: DISCONTINUED | OUTPATIENT
Start: 2020-06-24 | End: 2020-07-06 | Stop reason: HOSPADM

## 2020-06-24 RX ORDER — OLANZAPINE 10 MG/2ML
5-10 INJECTION, POWDER, FOR SOLUTION INTRAMUSCULAR 3 TIMES DAILY PRN
Status: DISCONTINUED | OUTPATIENT
Start: 2020-06-24 | End: 2020-07-06 | Stop reason: HOSPADM

## 2020-06-24 RX ORDER — DIPHENHYDRAMINE HCL 25 MG
50 CAPSULE ORAL EVERY 4 HOURS PRN
Status: DISCONTINUED | OUTPATIENT
Start: 2020-06-24 | End: 2020-07-06 | Stop reason: HOSPADM

## 2020-06-24 RX ORDER — OLANZAPINE 5 MG/1
5-10 TABLET ORAL 3 TIMES DAILY PRN
Status: DISCONTINUED | OUTPATIENT
Start: 2020-06-24 | End: 2020-07-06 | Stop reason: HOSPADM

## 2020-06-24 RX ADMIN — TOPIRAMATE 100 MG: 100 TABLET, FILM COATED ORAL at 08:20

## 2020-06-24 RX ADMIN — MELATONIN TAB 3 MG 3 MG: 3 TAB at 20:21

## 2020-06-24 RX ADMIN — LORAZEPAM 1 MG: 1 TABLET ORAL at 12:09

## 2020-06-24 ASSESSMENT — ACTIVITIES OF DAILY LIVING (ADL)
HYGIENE/GROOMING: INDEPENDENT
ORAL_HYGIENE: INDEPENDENT
LAUNDRY: WITH SUPERVISION
DRESS: INDEPENDENT

## 2020-06-24 NOTE — PROGRESS NOTES
Work Completed:  [facility] provided patient with [facility] Pre-Petition Screening letter for her review.  CTC met with Team 2 CTC in order to discuss treatment plan.    Discharge plan or goal:  TBD Petition for Civil Commitment initiated                Barriers to discharge:  Unknown depending disposition plan.

## 2020-06-24 NOTE — PROGRESS NOTES
TALON from [facility] with [facility] pre-petition screening. They are supporting at this time. Would like information on her insurance.     Does have a Housing worker, just came up for a housing voucher. [phone]
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
## 2020-06-24 NOTE — PROGRESS NOTES
"Attended 1 OT group. Arrived to group labile and verbalizing frustration and fear of being committed. Was able to follow 1-2 step verbal directions to calm self and redirect attention to positive proactive task. Easily distracted and initially indecisive. Unable to complete Self Assessment Form as it was\" too overwhelming\". By groups end noted, \"Maybe it is a good thing I am getting committed\". Discussed the benefit of receiving assistance in securing supports, moving toward stability and developing positive coping/self management skills. Will continue to encourage and support active group participation for increased self awareness, experiential learning and skill development.  "

## 2020-06-24 NOTE — PROGRESS NOTES
CTC met patient and gave her the Pre-Petition letter from [facility] to inform her of impending commitment.  [facility] Pre-Petition Screener will contact patient to complete the telephone interview.

## 2020-06-24 NOTE — PROGRESS NOTES
VM from pt's , Ruthie Boland, with [facility]. [phone]. CTC called back, left message that pt is admitted and petition has been made.    Call with Ruthie. Discussed pt situation.

## 2020-06-24 NOTE — PROGRESS NOTES
Pt had a decent shift  compare to yesterday. Pt continues to present with pressured speech and hyperverbal. Occassionally crying in regards to getting committed, and needed multiple reassurance that staff is here to help her. Pt was able  to use her coping skills and not become  verbally aggressive out in the lounge while upset . Pt had a phone screening with [facility] earlier today and was visible for lunch. Denies SI/SIB thoughts and HI, no further concern.          06/24/20 1401   Behavioral Health   Hallucinations denies / not responding to hallucinations   Thinking poor concentration   Orientation place: oriented;date: oriented;time: oriented   Memory baseline memory   Insight poor   Judgement impaired   Eye Contact at examiner   Affect sad;tense;irritable   Mood anxious;labile   Physical Appearance/Attire attire appropriate to age and situation   Hygiene well groomed   Suicidality other (see comments)  (Pt denies)   1. Wish to be Dead (Recent) No   Wish to be Dead Description (Recent)   (none stated or observed.)   2. Non-Specific Active Suicidal Thoughts (Recent) No   Non-Specific Active Suicidal Thought Description (Recent)   (none stated orobserved.)   Self Injury other (see comment)  (none observed. )   Activity hyperactive (agitated, impulsive);restless   Speech pressured;rambling   Medication Sensitivity no stated side effects;no observed side effects   Psychomotor / Gait balanced;steady   Psycho Education   Type of Intervention 1:1 intervention   Response participates, initiates socially appropriate   Hours 0.5   Treatment Detail   (1:1 check in )   Activities of Daily Living   Hygiene/Grooming independent   Oral Hygiene independent   Dress independent   Laundry with supervision   Room Organization independent

## 2020-06-24 NOTE — PROGRESS NOTES
At pt request, [facility] sent proof of hospital stay letter to Rosa Palafox at [facility] at fax number [phone]

## 2020-06-24 NOTE — PROGRESS NOTES
"[facility] [place]   Psychiatric Progress Note  Hospital Day: 2    Telemedicine Visit: The patient's condition can be safely assessed and treated via synchronous audio and visual telemedicine encounter.      Start Time: 1320  Stop Time: 1333    Reason for Telemedicine Visit: COVID-19    Originating Site (Patient Location): inpatient hospital unit. [facility]    Distant Site (Provider Location): Provider Remote Setting    Consent:  The patient/guardian has verbally consented to: the potential risks and benefits of telemedicine (video visit) versus in person care; bill my insurance or make self-payment for services provided; and responsibility for payment of non-covered services.     Mode of Communication:  Video Conference via [employer]    As the provider I attest to compliance with applicable laws and regulations related to telemedicine.                   Interim History:   The patient's care was discussed with the treatment team during the daily team meeting and/or staff's chart notes were reviewed.  Staff report patient was very loud and argumentative with pre-petition screener on the phone. She continues to show manic symptoms here.    Upon interview, the patient appeared sedated. She endorsed slurring words and feeling unsteady on her feet with the olanzapine. We reviewed numerous options other that olanzapine and she indicated that she is very scared of diabetes as several family members have it and she is \"pre-diabetic.\" She's had issues with weight gain on neuroleptics. She will continue with topiramate. She reported side effects to lithium and Depakote and even a rash with lamotrigine. I added this allergy to her history. We also discussed alternatives to trazodone for sleep due to risk of manic symptoms with antidepressants. She was agreeable to PRN Seroquel but wanted melatonin for a more \"natural\" approach.    Psychiatric Symptoms: chaka, insomnia, loud, agitated at " times    Medication side effects reported: sedation, slurred speech, unsteady on feet    Other issues reported by patient: none         Medications:       topiramate  100 mg Oral Daily          Allergies:     Allergies   Allergen Reactions     Penicillins Rash     Tylenol [Acetaminophen] Rash          Labs:     Recent Results (from the past 48 hour(s))   CBC with platelets differential    Collection Time: 06/23/20  7:34 AM   Result Value Ref Range    WBC 4.5 4.0 - 11.0 10e9/L    RBC Count 4.34 3.8 - 5.2 10e12/L    Hemoglobin 12.5 11.7 - 15.7 g/dL    Hematocrit 38.8 35.0 - 47.0 %    MCV 89 78 - 100 fl    MCH 28.8 26.5 - 33.0 pg    MCHC 32.2 31.5 - 36.5 g/dL    RDW 14.6 10.0 - 15.0 %    Platelet Count 209 150 - 450 10e9/L    Diff Method Automated Method     % Neutrophils 44.7 %    % Lymphocytes 40.9 %    % Monocytes 10.7 %    % Eosinophils 3.1 %    % Basophils 0.4 %    % Immature Granulocytes 0.2 %    Nucleated RBCs 0 0 /100    Absolute Neutrophil 2.0 1.6 - 8.3 10e9/L    Absolute Lymphocytes 1.8 0.8 - 5.3 10e9/L    Absolute Monocytes 0.5 0.0 - 1.3 10e9/L    Absolute Eosinophils 0.1 0.0 - 0.7 10e9/L    Absolute Basophils 0.0 0.0 - 0.2 10e9/L    Abs Immature Granulocytes 0.0 0 - 0.4 10e9/L    Absolute Nucleated RBC 0.0    Comprehensive metabolic panel    Collection Time: 06/23/20  7:34 AM   Result Value Ref Range    Sodium 144 133 - 144 mmol/L    Potassium 3.5 3.4 - 5.3 mmol/L    Chloride 113 (H) 94 - 109 mmol/L    Carbon Dioxide 22 20 - 32 mmol/L    Anion Gap 9 3 - 14 mmol/L    Glucose 120 (H) 70 - 99 mg/dL    Urea Nitrogen 9 7 - 30 mg/dL    Creatinine 0.88 0.52 - 1.04 mg/dL    GFR Estimate 84 >60 mL/min/[1.73_m2]    GFR Estimate If Black >90 >60 mL/min/[1.73_m2]    Calcium 8.5 8.5 - 10.1 mg/dL    Bilirubin Total 0.4 0.2 - 1.3 mg/dL    Albumin 2.9 (L) 3.4 - 5.0 g/dL    Protein Total 6.6 (L) 6.8 - 8.8 g/dL    Alkaline Phosphatase 96 40 - 150 U/L    ALT 27 0 - 50 U/L    AST 27 0 - 45 U/L   TSH with free T4 reflex     Collection Time: 06/23/20  7:34 AM   Result Value Ref Range    TSH 2.61 0.40 - 4.00 mU/L   Lipid panel reflex to direct LDL    Collection Time: 06/23/20  7:34 AM   Result Value Ref Range    Cholesterol 149 <200 mg/dL    Triglycerides 86 <150 mg/dL    HDL Cholesterol 53 >49 mg/dL    LDL Cholesterol Calculated 79 <100 mg/dL    Non HDL Cholesterol 96 <130 mg/dL          Psychiatric Examination:     /88   Pulse 102   Temp 98  F (36.7  C) (Oral)   Resp 16   SpO2 99%   Weight is 0 lbs 0 oz  There is no height or weight on file to calculate BMI.    Orthostatic Vitals       Most Recent      Sitting Orthostatic /92 06/23 1600    Sitting Orthostatic Pulse (bpm) 70 06/23 1600    Standing Orthostatic /82 06/24 0806    Standing Orthostatic Pulse (bpm) 92 06/24 0806            Appearance: awake, alert, adequately groomed and dressed in hospital scrubs  Attitude:  cooperative  Eye Contact:  good  Mood:  good  Affect:  intensity is normal  Speech:  clear, coherent and normal prosody  Language: fluent and intact in English  Psychomotor, Gait, Musculoskeletal:  no evidence of tardive dyskinesia, dystonia, or tics and intact station, gait and muscle tone  Throught Process:  goal oriented  Associations:  no loose associations  Thought Content:  no evidence of suicidal ideation or homicidal ideation and no evidence of psychotic thought  Insight:  limited  Judgement:  limited  Oriented to:  time, person, and place  Attention Span and Concentration:  fair  Recent and Remote Memory:  fair  Fund of Knowledge:  appropriate    Clinical Global Impressions  First:  Considering your total clinical experience with this particular patient population, how severe are the patient's symptoms at this time?: 7 (06/22/20 1205)  Compared to the patient's condition at the START of treatment, this patient's condition is: 4 (06/22/20 1205)  Most recent:  Considering your total clinical experience with this particular patient population,  "how severe are the patient's symptoms at this time?: 7 (06/22/20 1205)  Compared to the patient's condition at the START of treatment, this patient's condition is: 4 (06/22/20 1205)           Precautions:     Behavioral Orders   Procedures     Assault precautions     Code 1 - Restrict to Unit     Elopement precautions     Petition for commitment     MI with [facility]     Routine Programming     As clinically indicated     Sexual precautions     Single Room     Status 15     Every 15 minutes.          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 She was quite manic early on and required significant number of PRN medications. She was heavily sedated. Petition for commitment was filed and she was seen by the screener on 6/24/2020    Target psychiatric symptoms and interventions:  1. Tika  --Patient is not in agreement with olanzapine as a scheduled medication due to sedation, slurred speech, and feeling unsteady on her feet. Will discontinue.  --Continue topiramate 100 mg daily  --continue PRN haloperidol, olanzapine, and lorzepam available depending on symptom    2. Psychosis  -PRN haloperidol and olanzapine are available.    3. Insomnia  -change prn trazodone to Seroquel to reduce risk of worsening tika  -add melatonin scheduled per patient request for \"natural\" method    4. Neuroleptic side effects  -PRN diphenhydramine available as needed    5. Cannabis use  -will address this as tika and psychosis improve    Medical Problems and Treatments:  No acute issues    Behavioral/Psychological/Social:  Encourage unit programming          Disposition Plan   Reason for ongoing admission: is unable to care for self due " to severe psychosis or chaka  Discharge location: [facility] facility  Discharge Medications: not ordered  Follow-up Appointments: not scheduled  Legal Status: 72 hour hold  Entered by: Vaibhav Ortez on 6/24/2020 at 1:29 PM

## 2020-06-25 PROCEDURE — 12400001 ZZH R&B MH UMMC

## 2020-06-25 PROCEDURE — G0177 OPPS/PHP; TRAIN & EDUC SERV: HCPCS

## 2020-06-25 PROCEDURE — 90853 GROUP PSYCHOTHERAPY: CPT

## 2020-06-25 PROCEDURE — 25000132 ZZH RX MED GY IP 250 OP 250 PS 637: Mod: GY | Performed by: PSYCHIATRY & NEUROLOGY

## 2020-06-25 PROCEDURE — 25000132 ZZH RX MED GY IP 250 OP 250 PS 637: Mod: GY | Performed by: NURSE PRACTITIONER

## 2020-06-25 PROCEDURE — 99232 SBSQ HOSP IP/OBS MODERATE 35: CPT | Mod: 95 | Performed by: PSYCHIATRY & NEUROLOGY

## 2020-06-25 RX ADMIN — OLANZAPINE 10 MG: 5 TABLET, FILM COATED ORAL at 21:34

## 2020-06-25 RX ADMIN — NICOTINE POLACRILEX 4 MG: 4 GUM, CHEWING BUCCAL at 10:18

## 2020-06-25 RX ADMIN — HYDROXYZINE HYDROCHLORIDE 50 MG: 25 TABLET, FILM COATED ORAL at 10:18

## 2020-06-25 RX ADMIN — DIPHENHYDRAMINE HYDROCHLORIDE 50 MG: 25 CAPSULE ORAL at 17:40

## 2020-06-25 RX ADMIN — HYDROXYZINE HYDROCHLORIDE 25 MG: 25 TABLET, FILM COATED ORAL at 21:25

## 2020-06-25 RX ADMIN — HYDROXYZINE HYDROCHLORIDE 25 MG: 25 TABLET, FILM COATED ORAL at 14:57

## 2020-06-25 RX ADMIN — HALOPERIDOL 5 MG: 5 TABLET ORAL at 17:40

## 2020-06-25 RX ADMIN — MELATONIN TAB 3 MG 3 MG: 3 TAB at 21:25

## 2020-06-25 RX ADMIN — TOPIRAMATE 100 MG: 100 TABLET, FILM COATED ORAL at 08:08

## 2020-06-25 ASSESSMENT — ACTIVITIES OF DAILY LIVING (ADL)
HYGIENE/GROOMING: INDEPENDENT
DRESS: INDEPENDENT
DRESS: INDEPENDENT
ORAL_HYGIENE: INDEPENDENT
ORAL_HYGIENE: INDEPENDENT
LAUNDRY: WITH SUPERVISION
HYGIENE/GROOMING: INDEPENDENT;SHOWER

## 2020-06-25 NOTE — PROGRESS NOTES
Pt approached nurse to request a list of her current medications.  Appeared anxious and irritable with focus on commitment and medications.  She asked for PRN Hydroxyzine for anxiety which was not available as an order.  When staff told her that we can get it ordered she then refused and said she will just use her coping skills.  She made some phone calls but appeared upset when she couldn't contact her mother at [facility].  She stated that she my be having nicotine cravings too which is making her irritable.  Staff offered 1:1 time and contacted on-call for PRN Hydroxyzine and nicorette per her request.  Pt appears calmer later this evening.

## 2020-06-25 NOTE — PROGRESS NOTES
[facility], [place]   Psychiatric Progress Note  Hospital Day: 3    Telemedicine Visit: The patient's condition can be safely assessed and treated via synchronous audio and visual telemedicine encounter.      Start Time: 1133  Stop Time: 1141    Reason for Telemedicine Visit: COVID-19    Originating Site (Patient Location): inpatient hospital unit. [facility] station 32N    Distant Site (Provider Location): Provider Remote Setting    Consent:  The patient/guardian has verbally consented to: the potential risks and benefits of telemedicine (video visit) versus in person care; bill my insurance or make self-payment for services provided; and responsibility for payment of non-covered services.     Mode of Communication:  Video Conference via [employer]    As the provider I attest to compliance with applicable laws and regulations related to telemedicine.                   Interim History:   The patient's care was discussed with the treatment team during the daily team meeting and/or staff's chart notes were reviewed.  Staff reports that the patient has shown improvements. Petition for commitment was supported.    Upon interview, the patient indicates that she feels better without the Zyprexa. She slept well at first, but woke in the middle of the night. We discussed post-hospital options and she indicated that she most desires being able to return to work and be somewhat independent. She would have to stay at a shelter or in a hotel as she has no where else to go. She welcomes services to help and does say that if she loses her job she'd probably agree with [facility] placement.    Psychiatric Symptoms: pressured speech, hyper verbal, irritable at times, restless.    Medication side effects reported: denies    Other issues reported by patient: none         Medications:       melatonin  3 mg Oral At Bedtime     topiramate  100 mg Oral Daily          Allergies:     Allergies   Allergen Reactions      Lamotrigine Rash     Penicillins Rash     Tylenol [Acetaminophen] Rash          Labs:     No results found for this or any previous visit (from the past 48 hour(s)).       Psychiatric Examination:     /89   Pulse 63   Temp 98.8  F (37.1  C) (Oral)   Resp 14   SpO2 99%   Weight is 0 lbs 0 oz  There is no height or weight on file to calculate BMI.      Orthostatic Vitals       Most Recent      Standing Orthostatic /89 06/25 0700    Standing Orthostatic Pulse (bpm) 84 06/25 0700            Appearance: awake, alert, adequately groomed and dressed in hospital scrubs  Attitude:  cooperative  Eye Contact:  good  Mood:  good  Affect:  intensity is normal  Speech:  clear, coherent and normal prosody  Language: fluent and intact in English  Psychomotor, Gait, Musculoskeletal:  no evidence of tardive dyskinesia, dystonia, or tics and intact station, gait and muscle tone  Throught Process:  goal oriented  Associations:  no loose associations  Thought Content:  no evidence of suicidal ideation or homicidal ideation and no evidence of psychotic thought  Insight:  limited  Judgement:  limited  Oriented to:  time, person, and place  Attention Span and Concentration:  fair  Recent and Remote Memory:  fair  Fund of Knowledge:  appropriate    Clinical Global Impressions  First:  Considering your total clinical experience with this particular patient population, how severe are the patient's symptoms at this time?: 7 (06/22/20 1205)  Compared to the patient's condition at the START of treatment, this patient's condition is: 4 (06/22/20 1205)  Most recent:  Considering your total clinical experience with this particular patient population, how severe are the patient's symptoms at this time?: 7 (06/22/20 1205)  Compared to the patient's condition at the START of treatment, this patient's condition is: 4 (06/22/20 1205)           Precautions:     Behavioral Orders   Procedures     Assault precautions     Code 1 - Restrict  "to Unit     Elopement precautions     Petition for commitment     MI with [facility]     Routine Programming     As clinically indicated     Sexual precautions     Single Room     Status 15     Every 15 minutes.          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 She was quite manic early on and required significant number of PRN medications. She was heavily sedated. Petition for commitment was filed and she was seen by the screener on 2020. Court hold received before 72 . Scheduled olanzapine was stopped on  due to patient request.    Target psychiatric symptoms and interventions:  1. Tika  --Patient is not in agreement with olanzapine as a scheduled medication due to sedation, slurred speech, and feeling unsteady on her feet. Discontinued on .  --Continue topiramate 100 mg daily  --continue PRN haloperidol, olanzapine, and lorzepam available depending on symptom    2. Psychosis  -PRN haloperidol and olanzapine are available.    3. Insomnia  -changed prn trazodone to Seroquel to reduce risk of worsening tika on .  -added melatonin scheduled per patient request for \"natural\" method on     4. Neuroleptic side effects  -PRN diphenhydramine available as needed    5. Cannabis use  -will address this as tika and psychosis improve    Medical Problems and Treatments:  No acute issues    Behavioral/Psychological/Social:  Encourage unit programming        Disposition Plan   Reason for ongoing admission: is unable to care for self due to severe psychosis or tika  Discharge location: [facility] facility  Discharge Medications: not ordered  Follow-up Appointments: not scheduled  Legal Status: " court hold  Entered by: Vaibhav Ortez on 6/25/2020 at 2:22 PM

## 2020-06-25 NOTE — PROGRESS NOTES
Court hold and notice of hearing received.     Preliminary Court is 6/29 at 9 am  Final hearing is 7/2 at 9 am  [place] hearing is 7/2 at 9 am    Pt's  is Manuel Shannon at [phone]    Copy to pt, copy to chart

## 2020-06-25 NOTE — PLAN OF CARE
Pt. willing to engage with staff for a short time.  Pt.states that she is not suicidal, has never been suicidal.  Pt. Seems restless, pacing in the hallway.  Pt.'s mood is slightly tense but able to engage with staff appropriately overall.

## 2020-06-25 NOTE — PROGRESS NOTES
VM from Dr. Carlota Stafford. Would like to set up an examination and get medical records. [phone]    [facility] called, sent records. Dr. Stafford states would like to zoom with pt tomorrow afternoon,  1-2 pm    [facility] requested he send the zoom address, [facility] will work to figure out the best way to do this.

## 2020-06-25 NOTE — PLAN OF CARE
Problem: OT General Care Plan  Goal: OT Goal 1    Pt attended 2 out of 3 OT groups offered. Pt actively participated in occupational therapy clinic. Pt was able to ask for assistance as needed, and independently initiated a novel creative expression task after an initial demonstration. Pt demonstrated good focus (about x20 minute intervals) and planning, and took self-initiated breaks as needed. Social with peers and writer throughout. Future-oriented in discussing her goal to go back to school in the future, and demonstrated insight in discussing the importance of stabilizing her mental health prior to doing so. Expressed benefits of and appreciation for group. Pleasant, cooperative, and engaged. Bright affect.

## 2020-06-25 NOTE — PROGRESS NOTES
"See previous note for more information on pt status earlier in the evening. Pt showered later in evening and requested tampons from writer. She ate her dinner. Pt was able to obtain the number for her mother's care facility and staff updated this in her emergency contact list, as the existing number was disconnected. Nita expressed that she would like her mom to be able to speak to hospital staff during commitment process as \"she knows how I really am.\" She seemed calmer after she was able to connect with mother and other acquaintances on the phone. Writer saw pt smiling at one point.   "

## 2020-06-26 PROCEDURE — 25000132 ZZH RX MED GY IP 250 OP 250 PS 637: Mod: GY | Performed by: PSYCHIATRY & NEUROLOGY

## 2020-06-26 PROCEDURE — 12400001 ZZH R&B MH UMMC

## 2020-06-26 PROCEDURE — G0177 OPPS/PHP; TRAIN & EDUC SERV: HCPCS

## 2020-06-26 PROCEDURE — 99233 SBSQ HOSP IP/OBS HIGH 50: CPT | Mod: 95 | Performed by: PSYCHIATRY & NEUROLOGY

## 2020-06-26 PROCEDURE — 25000132 ZZH RX MED GY IP 250 OP 250 PS 637: Mod: GY | Performed by: NURSE PRACTITIONER

## 2020-06-26 RX ADMIN — TOPIRAMATE 100 MG: 100 TABLET, FILM COATED ORAL at 07:56

## 2020-06-26 RX ADMIN — HYDROXYZINE HYDROCHLORIDE 50 MG: 25 TABLET, FILM COATED ORAL at 16:24

## 2020-06-26 RX ADMIN — HYDROXYZINE HYDROCHLORIDE 50 MG: 25 TABLET, FILM COATED ORAL at 12:26

## 2020-06-26 RX ADMIN — MELATONIN TAB 3 MG 3 MG: 3 TAB at 22:39

## 2020-06-26 RX ADMIN — NICOTINE POLACRILEX 4 MG: 4 GUM, CHEWING BUCCAL at 17:01

## 2020-06-26 ASSESSMENT — ACTIVITIES OF DAILY LIVING (ADL)
HYGIENE/GROOMING: INDEPENDENT
ORAL_HYGIENE: INDEPENDENT
LAUNDRY: WITH SUPERVISION
DRESS: SCRUBS (BEHAVIORAL HEALTH);INDEPENDENT

## 2020-06-26 NOTE — PROGRESS NOTES
Attended 2 OT Clinic groups. Initiated group and sought out new task. Had difficulty making decisions when given more than 3 options. With structure and graded tasks was able to proceed with moderately difficult task. Needed some reorientation when she got distracted. Pleasant and social 1:1 and in relaxing/stress free environment.

## 2020-06-26 NOTE — PROGRESS NOTES
"Pt came to nurse's desk appearing angry and complaining that a male patient (TL) is following her, making odd comments to her and continuing to stare her down.  She stated \"He is gas-lighting me and I don't like it.\"  She then looked at him and made threatening comments toward him saying that if he continued to \"gas-light me\" she would \"light the gas\" and wasn't afraid to fight him off.  Staff intervened and RN spoke with both patients separately.  Pt went to her room and calmed down.  Later she returned to ask for PRN Haldol and Benadryl which she took readily.    She requested to talk with staff and shared that she is concerned her medication would change without her approval and that she needs to stay on Topamax and Zyprexa.  Pt said she feels more like her old self and that Zyprexa helps to \"level her out\" and that her thinking is clearer.  Staff indicated that those medications were still scheduled and that if she has concerns about medication changes she can follow up with her provider.    "

## 2020-06-26 NOTE — PROGRESS NOTES
"[facility], [place]   Psychiatric Progress Note  Hospital Day: 4    Telemedicine Visit: The patient's condition can be safely assessed and treated via synchronous audio and visual telemedicine encounter.      Start Time: 1128  Stop Time: 1139    Reason for Telemedicine Visit: COVID-19    Originating Site (Patient Location): inpatient hospital unit. [facility] station 32N    Distant Site (Provider Location): Provider Remote Setting    Consent:  The patient/guardian has verbally consented to: the potential risks and benefits of telemedicine (video visit) versus in person care; bill my insurance or make self-payment for services provided; and responsibility for payment of non-covered services.     Mode of Communication:  Video Conference via [employer]    As the provider I attest to compliance with applicable laws and regulations related to telemedicine.               Interim History:   The patient's care was discussed with the treatment team during the daily team meeting and/or staff's chart notes were reviewed.  Staff reports that the patient became agitated with another patient. This other patient has a history of being intrusive though, and was was bothering Ivelisse. Ivelisse was not able to redirect well. Patient initially didn't want to meet with me, instead telling the nurse she was going to think about it. She did elect to meet with me about 30 minutes later.    Upon interview, the patient came in with notes, but wanted them to write. She didn't have issues to address. When I tried to talk about the incident with another patient, she became tense and appeared irritated. She said, \"that was yesterday and today's another day.\" She will meet with court examiner at 1 PM today.    Patient indicates that she is very upset about being restrained and injected with medications. Upon further questioning, she indicates that this happened in the ER. She tells me that she is traumatized and will alyssa. " She asked the police and paramedics not to restrain her. She states they both promised not to and that everything was fine. She repeats that she was not a danger at the time and believes the restraint was not justified. I encouraged patient to speak with patient relations.    Psychiatric Symptoms: pressured speech, hyper verbal, irritable at times, restless.    Medication side effects reported: denies    Other issues reported by patient: none         Medications:       melatonin  3 mg Oral At Bedtime     topiramate  100 mg Oral Daily          Allergies:     Allergies   Allergen Reactions     Lamotrigine Rash     Penicillins Rash     Tylenol [Acetaminophen] Rash          Labs:     No results found for this or any previous visit (from the past 48 hour(s)).       Psychiatric Examination:     /81   Pulse 63   Temp 98.1  F (36.7  C) (Oral)   Resp 14   SpO2 100%   Weight is 0 lbs 0 oz  There is no height or weight on file to calculate BMI.    Orthostatic Vitals       Most Recent      Standing Orthostatic /92 06/26 0918    Standing Orthostatic Pulse (bpm) 93 06/26 0918            Appearance: awake, alert, adequately groomed and dressed in hospital scrubs  Attitude:  somewhat cooperative  Eye Contact:  intense  Mood:  angry  Affect:  intensity is heightened  Speech:  clear, coherent and normal prosody  Language: fluent and intact in English  Psychomotor, Gait, Musculoskeletal:  no evidence of tardive dyskinesia, dystonia, or tics and intact station, gait and muscle tone  Throught Process:  goal oriented  Associations:  no loose associations  Thought Content:  no evidence of suicidal ideation or homicidal ideation, no evidence of psychotic thought and perseverating on restraint event in ER.  Insight:  limited  Judgement:  limited  Oriented to:  time, person, and place  Attention Span and Concentration:  fair  Recent and Remote Memory:  fair  Fund of Knowledge:  appropriate      Clinical Global  Impressions  First:  Considering your total clinical experience with this particular patient population, how severe are the patient's symptoms at this time?: 7 (20 120)  Compared to the patient's condition at the START of treatment, this patient's condition is: 4 (20)  Most recent:  Considering your total clinical experience with this particular patient population, how severe are the patient's symptoms at this time?: 7 (20)  Compared to the patient's condition at the START of treatment, this patient's condition is: 4 (20)           Precautions:     Behavioral Orders   Procedures     Assault precautions     Code 1 - Restrict to Unit     Elopement precautions     Petition for commitment     MI with [facility]     Routine Programming     As clinically indicated     Sexual precautions     Single Room     Status 15     Every 15 minutes.          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 She was quite manic early on and required significant number of PRN medications. She was heavily sedated. Petition for commitment was filed and she was seen by the screener on 2020. Court hold received before 72 . Scheduled olanzapine was stopped on  due to patient request. She had been looking better, but on  again looked worse. She was more irritable and perseverative with me. She also appeared a bit paranoid.    Target psychiatric symptoms and interventions:  1. Tika - worsening  --Patient is not in agreement with olanzapine as a scheduled medication due to sedation, slurred speech, and feeling unsteady on her feet. Discontinued on . May need court order if  "medications that she does accept are ineffective.  --Continue topiramate 100 mg daily  --Continue PRN haloperidol, olanzapine, and lorzepam available depending on symptom    2. Psychosis  -PRN haloperidol and olanzapine are available.    3. Insomnia  -changed prn trazodone to Seroquel to reduce risk of worsening chaka on 6/24.  -added melatonin scheduled per patient request for \"natural\" method on 6/24    4. Neuroleptic side effects  -PRN diphenhydramine available as needed    5. Cannabis use  -will address this as chaka and psychosis improve    Medical Problems and Treatments:  No acute issues    Behavioral/Psychological/Social:  Encourage unit programming        Disposition Plan   Reason for ongoing admission: is unable to care for self due to severe psychosis or chaka  Discharge location: [facility] facility  Discharge Medications: not ordered  Follow-up Appointments: not scheduled  Legal Status: court hold  Entered by: Vaibhav Ortez on 6/26/2020 at 11:16 AM            "

## 2020-06-26 NOTE — PROGRESS NOTES
06/26/20 1423   Behavioral Health   Hallucinations denies / not responding to hallucinations   Thinking intact   Orientation time: oriented;date: oriented;place: oriented;person: oriented   Memory baseline memory   Insight poor   Judgement impaired   Eye Contact at examiner   Affect full range affect;sad   Mood mood is calm;irritable;hopeless   Physical Appearance/Attire neat;attire appropriate to age and situation;appears stated age   Suicidality   (Denies)   1. Wish to be Dead (Recent) No   2. Non-Specific Active Suicidal Thoughts (Recent) No   Self Injury   (Denies)   Elopement Statements about wanting to leave   Activity   (Active in groups and milieu)   Speech clear;coherent   Medication Sensitivity no observed side effects;no stated side effects   Psychomotor / Gait balanced;steady   Activities of Daily Living   Hygiene/Grooming independent   Oral Hygiene independent   Dress scrubs (behavioral health);independent   Laundry with supervision   Room Organization independent     Pt had a calm/sad shift. Pt denies SI, SIB, hallucinations and side effects from medications. Pt states that she's pretty anxious because she does not want to be committed. However, Pt is handling it very well by using coping tools/skills, talking to staff, and asking for medications. Pt states that she feels more numb that depressed. Pt is looking to speak to a different provider to help with her situation. Pt doesn't wish to be dead and feel safe in the unit. Pt was active in groups and milieu. Pt has been using coping tools throughout the shift to help her calm down.

## 2020-06-26 NOTE — PROGRESS NOTES
06/25/20 2200   Significant Event   Significant Event Other (see comments)  (shift summary)     Pt was irritable and tense at the beginning of the shift, claims that another patient was standing too close to her and saying things to her, which wasn't the case.  Staff was standing in the lounge and didn't hear anything.  Pt appeared to be irritable, delusional, flat/blunted affect, no stated SI, SIB or hallucinations..

## 2020-06-26 NOTE — PROGRESS NOTES
06/25/20 2000   Group Therapy Session   Group Attendance attended group session   Total Time (minutes) 30   Group Type psychotherapeutic   Patient Participation/Contribution cooperative with task;discussed personal experience with topic;listened actively   Patient participated in psychotherapy group which included a mindfulness activity focusing on the 5 senses and then processing as a group.    Donovan arrived late to group. She joined in right away. She presented in a pleasant mood. Congruent affect. Actively engaged in the activity and processed with the group. Demonstrated positive social interaction.

## 2020-06-27 PROCEDURE — 12400001 ZZH R&B MH UMMC

## 2020-06-27 PROCEDURE — 25000132 ZZH RX MED GY IP 250 OP 250 PS 637: Mod: GY | Performed by: PSYCHIATRY & NEUROLOGY

## 2020-06-27 PROCEDURE — 25000132 ZZH RX MED GY IP 250 OP 250 PS 637: Mod: GY | Performed by: NURSE PRACTITIONER

## 2020-06-27 RX ADMIN — OLANZAPINE 10 MG: 5 TABLET, FILM COATED ORAL at 22:37

## 2020-06-27 RX ADMIN — NICOTINE POLACRILEX 4 MG: 4 GUM, CHEWING BUCCAL at 14:31

## 2020-06-27 RX ADMIN — HYDROXYZINE HYDROCHLORIDE 25 MG: 25 TABLET, FILM COATED ORAL at 03:50

## 2020-06-27 RX ADMIN — DIPHENHYDRAMINE HYDROCHLORIDE 50 MG: 25 CAPSULE ORAL at 11:16

## 2020-06-27 RX ADMIN — HYDROXYZINE HYDROCHLORIDE 50 MG: 25 TABLET, FILM COATED ORAL at 20:06

## 2020-06-27 RX ADMIN — OLANZAPINE 10 MG: 5 TABLET, FILM COATED ORAL at 12:37

## 2020-06-27 RX ADMIN — HYDROXYZINE HYDROCHLORIDE 50 MG: 25 TABLET, FILM COATED ORAL at 09:55

## 2020-06-27 RX ADMIN — TOPIRAMATE 100 MG: 100 TABLET, FILM COATED ORAL at 08:14

## 2020-06-27 RX ADMIN — MELATONIN TAB 3 MG 3 MG: 3 TAB at 21:55

## 2020-06-27 ASSESSMENT — ACTIVITIES OF DAILY LIVING (ADL)
HYGIENE/GROOMING: HANDWASHING;INDEPENDENT
DRESS: SCRUBS (BEHAVIORAL HEALTH);INDEPENDENT
ORAL_HYGIENE: INDEPENDENT

## 2020-06-27 NOTE — PROGRESS NOTES
"2nd RN face-to-face assessment completed. This writer spoke to pt about behaviors that led to the code 21. Pt stated that another female peer was \"winking and waving her hands at me. She's gaslighting me and none of the staff believe me. I can't prove it but I know she's gaslighting me. So I had to go after her cause it's either her or me!\" This writer explained to the pt that in order for her to come out of seclusion she would have to stop targeting and threatening other patients and come to staff first if she is having conflicts with peers. Pt stated that she didn't want to go to staff because no one believes that the other pt is \"gaslighting her.\" Pt said that she would \"handle it herself.\" Stated that she understood but did not agree to safe behaviors. Pt was irritable and dismissive towards this writer throughout the conversation. Pt refused her lunch tray. Until pt agrees to safe behaviors, continues to require seclusion at this time.   "

## 2020-06-27 NOTE — PROVIDER NOTIFICATION
"RN R/S     SECLUSION INITIATED 1225    MD order Yes:      DID patient experience adverse physical outcome from seclusion/restraint initiation? NO    Patient reaction to R/S:    Does patient verbalize understanding of discontinuation criteria? YES    Were PRN medications administered? YES    MEDICAL/PHYSICAL: NA    CURRENT BEHAVIOR: Angry, irritable, irrational, agitated, posturing, refuses to follow direction.    Staff Review: Patient 1225 SECLUSION initiated after patient was  In hallway and gave the finger to 2 female patients. Patient was offered earphones and asked to please refrain from gestures to other patients. Patient responded \"I will fucking kill her (pointing to a female patient). Patient postured in wide armed swinging gestures and stated \"You might not believe me but on this unit it is her or me and I will jump on her and kill her.\" Patient declined initial offer of PRN medication. Patient declined to allow distance between self and target patient. Patient continued becoming louder saying \"I will kill her.\"    Patient was asked to walk to secsion and refused. CODE 21 called at which time patient went to her room saying \"If you put me in restraints I will alyssa you-I have a pending lawsuit against you. \"     -Patient was offered a choice of oral or injection PRN medication and accepted oral Zyprexa.       -Patient was asked to walk to [facility] when multiple staff arrived.    -[facility].    -MD updated.       06/27/20 1225   Seclusion or Restraint Order   Length of Order 4   Order Obtained Yes   Attending Physician Notified Yes   Attending Physician's Name Andish   In Person Face to Face Assessment Conducted Yes-Eval of pt's immediate situation, reaction to intervention, complete review of systems assessment, behavioral assessment & review/assessment of hx, drugs & meds, recent labs, etc, behavioral condition, need to continue/terminate restraint/seclusion   Patient " Experienced No adverse physical outcome from seclusion/restraint initiation   Continuation of Seclus/Restraint indicated at this time Yes   Assessment   Less Restrictive Alternative Decreased stimulation;Medication administration;Verbal de-escalation;Reassurance / Support;Immediate action required, no least restrictive measures could be attempted   Risk Factors O  (PTSD)   Justification   Clinical Justification Others   Education   Discontinuation Criteria Cessation of behavior that precipitated seclusion or restraint   Criteria Explained Yes   Patient's Response RT   Family Notification D   Restraint Monitoring Q15 Minutes   Psychological Status YE   Circulation NS   Continuous Observation Yes   Restraint Type   Seclusion (BH) Start   Debriefing   Debriefing DO   Does patient agree to safe behaviors? No   What can we do differently so this doesn't happen again? Patient refuses to answer   Plan of care reviewed and modified Yes       Nursing will continue to monitor.
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
## 2020-06-27 NOTE — PROGRESS NOTES
"Patient awoke early this morning.  Ate breakfast and attended a Community Meeting in the OTR.  For the most part, patient had been pleasant and positive in her demeanor.  However, around noon, patient became quite agitated regarding another female peer (Rm. [id number]).  Patient, began to escalate and make threats towards this patient.  \"That bitch is gas-lighting me!\"  \"I don't care if I go to seclusion, when I get out I'm gonna beat her ass!\"  \"I don't care, I'll throw a chair at that bitch!\"  Patient did walk to her room, but continued to make threats towards this other peer.  Patient walked to seclusion with additional staff present.  Patient states that to let this 'gas-lighting' go unanswered, would be tantamount to backing-down; and patient is not about to do that.  Patient remains in seclusion through lunch.  Will monitor patient's behavior in milieu and re-direct as needed.  "

## 2020-06-27 NOTE — PROGRESS NOTES
06/27/20 1831   Patient Belongings   Did you bring any home meds/supplements to the hospital?  Yes   Disposition of meds  Sent to security/pharmacy per site process;Other (see comment)   Patient Belongings remains with patient;sent to security per site process   Patient Belongings Remaining with Patient shoes;necklace;other (see comments)   Patient Belongings Put in Hospital Secure Location (Security or Locker, etc.) medication(s)   Belongings Search Yes   Clothing Search Yes   Second Staff Eliana GEORGE   Comment Transfer from 32; no belonging sheet was done for her on 32       Locker:  book  Sandals  Yoga mat walker  Beaded necklace   Assorted candy  Large coin purse  [employer] bed sheet    Security ([id number]):  pill walker with various pills in it.          A               Admission:  I am responsible for any personal items that are not sent to the safe or pharmacy.  [facility] is not responsible for loss, theft or damage of any property in my possession.    Signature:  _________________________________ Date: _______  Time: _____                                              Staff Signature:  ____________________________ Date: ________  Time: _____      2nd Staff person, if patient is unable/unwilling to sign:    Signature: ________________________________ Date: ________  Time: _____     Discharge:  [facility] has returned all of my personal belongings:    Signature: _________________________________ Date: ________  Time: _____                                          Staff Signature:  ____________________________ Date: ________  Time: _____

## 2020-06-27 NOTE — PROGRESS NOTES
Patient arrived on unit at 1500. Check in was not able to be completed with her due to timing. A code 21 was called to transport her from station 32 to here in. No issues during transport. Currently patient remains in room.

## 2020-06-28 PROCEDURE — 25000132 ZZH RX MED GY IP 250 OP 250 PS 637: Mod: GY | Performed by: PSYCHIATRY & NEUROLOGY

## 2020-06-28 PROCEDURE — 25000132 ZZH RX MED GY IP 250 OP 250 PS 637: Mod: GY | Performed by: NURSE PRACTITIONER

## 2020-06-28 PROCEDURE — 12400001 ZZH R&B MH UMMC

## 2020-06-28 RX ADMIN — MELATONIN TAB 3 MG 3 MG: 3 TAB at 21:50

## 2020-06-28 RX ADMIN — HYDROXYZINE HYDROCHLORIDE 50 MG: 25 TABLET, FILM COATED ORAL at 11:45

## 2020-06-28 RX ADMIN — TOPIRAMATE 100 MG: 100 TABLET, FILM COATED ORAL at 08:15

## 2020-06-28 RX ADMIN — HYDROXYZINE HYDROCHLORIDE 25 MG: 25 TABLET, FILM COATED ORAL at 18:16

## 2020-06-28 ASSESSMENT — ACTIVITIES OF DAILY LIVING (ADL)
ORAL_HYGIENE: INDEPENDENT
HYGIENE/GROOMING: INDEPENDENT
LAUNDRY: WITH SUPERVISION
DRESS: SCRUBS (BEHAVIORAL HEALTH);INDEPENDENT

## 2020-06-28 NOTE — PROGRESS NOTES
"   06/28/20 1600   Group Therapy Session   Group Attendance attended group session   Total Time (minutes) 50   Group Type psychotherapeutic   Patient Participation/Contribution cooperative with task;discussed personal experience with topic;listened actively   Group Goal:   Patients will gain insight into thoughts, feelings, topics, or situations through the use of a \"quick-draw\" art techniques, then process as a group.     Ivelisse reports feeling \"content.\" She states, \"I also feel a little bit excited but don't know why.\"  She presents in a bright mood. Affect congruent. She engaged in the activity and openly processed with the group. One of the situations writer asked group to draw was \"Draw your biggest challenge.\" Ivelisse reports her biggest challenge is managing the intense feelings she has when she thinks someone might be \"gaslighting\" her. She shared insight in that she realizes that when she \"thinks\" someone might be gaslighting her, it may or may not be true.  She actively listened to others and demonstrated positive social interactions. She reports feeling more relaxed on [address] than her previous unit.   "

## 2020-06-28 NOTE — PROGRESS NOTES
"Pt displayed calm demeanor throughout morning, pt reported, \"feeling much calmer now that I'm on a calmer floor\". Pt spent day shift majority in milieu watching TV, listening to headphones or in her bedroom resting. Pt reported some anxiety, but denied AH/VH currently. Pt seemed mildly agitated during morning check in group, but overall calmed. Pt denied any SI/SIB/HI.      06/28/20 1127   Behavioral Health   Hallucinations denies / not responding to hallucinations   Thinking distractable;intact   Orientation person: oriented;place: oriented;date: oriented   Memory baseline memory   Insight poor   Judgement impaired   Eye Contact at examiner   Affect full range affect   Mood mood is calm;anxious   Physical Appearance/Attire attire appropriate to age and situation   Hygiene well groomed   Suicidality other (see comments)  (denies)   1. Wish to be Dead (Recent) No   2. Non-Specific Active Suicidal Thoughts (Recent) No   3. Active Sucidal Ideation with any Methods (Not Plan) Without Intent to Act (Recent) No   4. Active Suicidal Ideation with Some Intent to Act, Without Specific Plan (Recent) No   5. Active Suicidal Ideation with Specific Plan and Intent (Recent) No     "
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
## 2020-06-29 PROCEDURE — 12400001 ZZH R&B MH UMMC

## 2020-06-29 PROCEDURE — 90853 GROUP PSYCHOTHERAPY: CPT

## 2020-06-29 PROCEDURE — 25000132 ZZH RX MED GY IP 250 OP 250 PS 637: Mod: GY | Performed by: PSYCHIATRY & NEUROLOGY

## 2020-06-29 PROCEDURE — 25000132 ZZH RX MED GY IP 250 OP 250 PS 637: Mod: GY | Performed by: NURSE PRACTITIONER

## 2020-06-29 PROCEDURE — 99232 SBSQ HOSP IP/OBS MODERATE 35: CPT | Mod: 95 | Performed by: PSYCHIATRY & NEUROLOGY

## 2020-06-29 PROCEDURE — G0177 OPPS/PHP; TRAIN & EDUC SERV: HCPCS

## 2020-06-29 RX ORDER — ZIPRASIDONE HYDROCHLORIDE 40 MG/1
40 CAPSULE ORAL AT BEDTIME
Status: DISCONTINUED | OUTPATIENT
Start: 2020-06-29 | End: 2020-07-01

## 2020-06-29 RX ADMIN — HYDROXYZINE HYDROCHLORIDE 50 MG: 25 TABLET, FILM COATED ORAL at 18:06

## 2020-06-29 RX ADMIN — ZIPRASIDONE HYDROCHLORIDE 40 MG: 40 CAPSULE ORAL at 20:10

## 2020-06-29 RX ADMIN — HYDROXYZINE HYDROCHLORIDE 50 MG: 25 TABLET, FILM COATED ORAL at 13:24

## 2020-06-29 RX ADMIN — HYDROXYZINE HYDROCHLORIDE 50 MG: 25 TABLET, FILM COATED ORAL at 08:20

## 2020-06-29 RX ADMIN — TOPIRAMATE 100 MG: 100 TABLET, FILM COATED ORAL at 08:20

## 2020-06-29 RX ADMIN — MELATONIN TAB 3 MG 3 MG: 3 TAB at 20:10

## 2020-06-29 RX ADMIN — OLANZAPINE 5 MG: 5 TABLET, FILM COATED ORAL at 11:13

## 2020-06-29 ASSESSMENT — ACTIVITIES OF DAILY LIVING (ADL)
LAUNDRY: WITH SUPERVISION
DRESS: INDEPENDENT;SCRUBS (BEHAVIORAL HEALTH)
ORAL_HYGIENE: INDEPENDENT
HYGIENE/GROOMING: INDEPENDENT

## 2020-06-29 NOTE — PROGRESS NOTES
"SPIRITUAL HEALTH SERVICES  [facility] Station 10  ON-CALL VISIT     REFERRAL SOURCE: I did phone visit this afternoon patient Ivelisse per [facility] consult order. I introduced myself as the on-call  and shared all the info about the [facility].     I tried to listen the pt reflectively. Pt said, \"I am scared to take the medication which I have been told to take it. No one is listening for what I said. In all these, I didn't refuse to take it but I have some fear about the medication from my past experiences. I am in full of fear to make a decision about the medication, I have been told taking it. I do need a prayer to get some strength and clarity. I do believe in God and I do need His power to be up on me, to win the oliver of fear that I face in.\" After I listen the pt, we spoke about God's miraculous way of His power how He revels. I also shared the hope giving message from the Bible and at the end of our conversation, I offered her a prayer.     PLAN: The unit  will follow up and provide spiritual care for the pt as needed.     Vinnie Martinez M.Div. (Alem), M.Th., D.Min., [facility]  Staff   Pager [phone]    "

## 2020-06-29 NOTE — PROGRESS NOTES
"The patient was transferred from Station 32 to Station 10 over the weekend in the [facility].  Neither [facility] new that patient had been transferred causing much confusion as patient was to have a 9am Preliminary Hearing on Station 32 this morning but was not there and staff here were unaware of her court status or the hearing.  This writer spoke with [facility] our HUC who set up a telephone conference for the Preiminary Hearing here on the unit.  Patient listened for less than 10 minutes.  This writer tried to talk with her and explain the commitment hearing and samayoa but she became agitated stating, \"I don't want to hear your social work jargon\" and then walked out.  Informed RN and Psych Associate that she was agitated.  Patient returned to talk to me and said that the number she has for the R CM contracted through [facility] isn't working and she needs the number clarified.  Patient reiterates that she WAS taking her medications \"and what good does it do to be committed and here if I lose my housing and my job?  How is that going to help my mental health?\"     This writer called the patient's  Ruthie Melton [phone].  My call to her did go through.  She said that patient's status in the community is currently \"homeless\" but she had been living at the [facility] in [facility] [place] and when they closed she transferred to a hotel.  She started working two weeks ago for [employer].  ALL Hi on Station 32 already sent a letter to SHERIE Mancia to let patient's boss know she is currently hospitalized.  R ERIN Hendricks said that she had spoken to the patient shortly before she was admitted and patient seemed to be doing well. She feels the patient decompensated \"very quickly\" and may have become confused about her medications, not taking all of them as prescribed.  TAY Hendricks also confirmed that patient had letter from [facility] inviting her to apply " for a housing voucher but she is not sure now if the [facility] will allow her to apply so MHR CM will check on this.  Patient sees Dr. Polo Travis at [facility] for ongoing medication management.  Patient carries a diagnosis of Bipolar Affective Disorder, currently manic with psychosis.  Will keep the patient's MHR  informed of the court decisions once we receive them.      Her FINAL MI COMMITMENT/GONZALEZ hearing is on Thursday, July 2nd 2020 at 9:00am here in Conference Room Number N127 on Station 10N. .  YESICA Quintero has been informed and email procedures will be followed by this writer so that [facility] may connect to the hospital ITV system.  [facility] who sets up the ITV hearings.  Sari plans to email Anna Beasley tomorrow to initiate the ITV Final Hearing process for Thursday 9am here on Station 10N. This writer will also generate an email with Sari's contact information to Anna aguirre.

## 2020-06-29 NOTE — PROGRESS NOTES
"Pt stated that she is feeling much more comfortable on Station 10, than prior station. Pt attended group this afternoon and stated she enjoyed the activity they did. Pt states depression is \"not bad\" but anxiety is about a 5/10 currently. Affect has been relatively flat, but full range at times. Concentration is good, denies any current AH/VH. Denies SI/SIB. Independent with ADLs, showered today. PT is feeling hopeful for when she is released. Pt has some anxiety about tomorrow being Monday, due to job/employment and meeting with doctor.        06/28/20 2144   Behavioral Health   Hallucinations denies / not responding to hallucinations   Thinking intact   Orientation date: oriented;place: oriented;person: oriented;time: oriented   Memory baseline memory   Insight insight appropriate to situation;insight appropriate to events   Judgement intact   Eye Contact at examiner   Affect full range affect   Mood mood is calm   Physical Appearance/Attire attire appropriate to age and situation;appears stated age   Hygiene well groomed   Suicidality other (see comments)  (denies)   1. Wish to be Dead (Recent) No   Wish to be Dead Description (Recent)   (pt denies)   2. Non-Specific Active Suicidal Thoughts (Recent) No   Non-Specific Active Suicidal Thought Description (Recent)   (PT denies)   3. Active Sucidal Ideation with any Methods (Not Plan) Without Intent to Act (Recent) No   Active Suicidal Ideation with any Methods (Not Plan) Description (Recent)   (Pt denies)   Self Injury other (see comment)  (Pt denies)   Elopement   (None observed)   Activity other (see comment)  (Present in milieu)   Speech coherent;clear   Medication Sensitivity no observed side effects;no stated side effects   Psychomotor / Gait steady;balanced   Activities of Daily Living   Hygiene/Grooming independent   Oral Hygiene independent   Dress scrubs (behavioral health);independent   Laundry with supervision   Room Organization independent     "

## 2020-06-29 NOTE — PLAN OF CARE
Patient had preliminary court hearing this morning. She was very upset after she finished talking to the . Final hearing is on July 2nd. Patient received prn Zyprexa 5 mg for agitation. She also received prn hydroxyzine twice. She exercised on the bike. Writer was not able to check in with patient because she is taking a nap. Otherwise she was calm and cooperative with staff. Medication compliant. Affect is tense. No SI/SIB/HI/AVH. Appetite and sleep is good. Staff will continue to monitor.

## 2020-06-30 LAB — GLUCOSE BLDC GLUCOMTR-MCNC: 68 MG/DL (ref 70–99)

## 2020-06-30 PROCEDURE — 25000132 ZZH RX MED GY IP 250 OP 250 PS 637: Mod: GY | Performed by: NURSE PRACTITIONER

## 2020-06-30 PROCEDURE — 00000146 ZZHCL STATISTIC GLUCOSE BY METER IP

## 2020-06-30 PROCEDURE — 99232 SBSQ HOSP IP/OBS MODERATE 35: CPT | Mod: 95 | Performed by: PSYCHIATRY & NEUROLOGY

## 2020-06-30 PROCEDURE — 25000132 ZZH RX MED GY IP 250 OP 250 PS 637: Mod: GY | Performed by: PSYCHIATRY & NEUROLOGY

## 2020-06-30 PROCEDURE — 12400001 ZZH R&B MH UMMC

## 2020-06-30 PROCEDURE — G0177 OPPS/PHP; TRAIN & EDUC SERV: HCPCS

## 2020-06-30 RX ADMIN — HYDROXYZINE HYDROCHLORIDE 25 MG: 25 TABLET, FILM COATED ORAL at 08:29

## 2020-06-30 RX ADMIN — HYDROXYZINE HYDROCHLORIDE 25 MG: 25 TABLET, FILM COATED ORAL at 19:26

## 2020-06-30 RX ADMIN — TOPIRAMATE 100 MG: 100 TABLET, FILM COATED ORAL at 08:29

## 2020-06-30 RX ADMIN — MELATONIN TAB 3 MG 3 MG: 3 TAB at 20:52

## 2020-06-30 RX ADMIN — ZIPRASIDONE HYDROCHLORIDE 40 MG: 40 CAPSULE ORAL at 20:52

## 2020-06-30 ASSESSMENT — ACTIVITIES OF DAILY LIVING (ADL)
DRESS: SCRUBS (BEHAVIORAL HEALTH)
LAUNDRY: WITH SUPERVISION
DRESS: SCRUBS (BEHAVIORAL HEALTH)
ORAL_HYGIENE: INDEPENDENT
LAUNDRY: WITH SUPERVISION
ORAL_HYGIENE: INDEPENDENT
HYGIENE/GROOMING: INDEPENDENT
HYGIENE/GROOMING: INDEPENDENT

## 2020-06-30 NOTE — PROGRESS NOTES
Spoke with Carmen Munoz in [facility].  She has been trying to work with [facility] but they said that the patient needed to sign an DHS Release of Information so patient did comply and the form was successfully faxed back to Carmen.  Spoke with the patient's MHR [facility] CM Ruthie Melton to update her on the MA issue that Carmen IS working on at present.  Ruthie said that she is in agreement with the hospital asking for a Stay of Commitment as patient is fully compliant with medications, cooperates with staff directives, attends programming and there is no evidence of psychosis or chaka.  The only symptom she complained of this morning was some anxiety and the nurse gave her Visteral.  Successfully faxed  The [facility] Treating Physician's Recommendation to the Court to Dianne at [facility].  The Final Hearing on ITV has been arranged by Anna Beasley and is now scheduled for Thursday, July 2nd at 10:00am in our large Conference room on 10N.

## 2020-06-30 NOTE — PROGRESS NOTES
Work Completed:   Patient's FINAL hearing has been set up through [facility] and IT for Thursday at 10am in our conference room.  Recommendations to the Court asking for a Stay of  Commitment were successfully faxed to [place] [facility] this morning.      Discharge plan or goal: Complete Final Hearing and await court order.                Barriers to discharge: Court commitment process and stabilization.

## 2020-06-30 NOTE — PROGRESS NOTES
"Pt has been visible in the milieu all day. Pt has been social and pleasant. Pt attended at least 2 groups. Pt rated her depression and anxiety each a \"5\". Pt's affect has been full range and she said that she is hopeful. Pt's goal was, \"to continue healing and get in touch with my \". Pt said she slept \"great\" and said her appetite is \"good\". Pt said she has \"PTSD probably not bipolar\". Pt denies SI and SIB thoughts.     06/30/20 1333   Behavioral Health   Hallucinations denies / not responding to hallucinations   Thinking intact   Orientation person: oriented;place: oriented;date: oriented;time: oriented   Memory baseline memory   Insight insight appropriate to events   Judgement impaired   Eye Contact at examiner   Affect full range affect;incongruent   Mood depressed;anxious   Physical Appearance/Attire neat;attire appropriate to age and situation;appears stated age   Hygiene well groomed   Suicidality   (denies)   1. Wish to be Dead (Recent) No   2. Non-Specific Active Suicidal Thoughts (Recent) No   Self Injury   (denies)   Elopement (WDL) WDL   Activity (WDL) WDL   Speech coherent;clear   Medication Sensitivity no stated side effects;no observed side effects   Psychomotor / Gait steady;balanced   Overt Agression (WDL) WDL   Coping/Psychosocial   Verbalized Emotional State anxiety;depression;hopefulness   Safety   Suicidality Status 15   Elopement status 15   Activities of Daily Living   Hygiene/Grooming independent   Oral Hygiene independent   Dress scrubs (behavioral health)   Laundry with supervision   Room Organization independent     "
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
## 2020-06-30 NOTE — PLAN OF CARE
OT Groups Attended: Clinic     Affect/Hygiene/Presentation: Calm/pleasant, engaged, congruent affect. No apparent hygiene concerns.     Patient Performance/Response: Pt actively participated in occupational therapy clinic. Pt was able to ask for assistance as needed, and independently initiate a novel, goal-directed task with minimal assistance for task selection. Pt demonstrated good focus and attention to detail during task completion. Pt appeared comfortable interacting with peers and writer, and was observed to compliment a peer on his work at the end of group.     Plan: Pt will be encouraged to maintain group attendance for continued ongoing assessment and support in completion of occupational therapy treatment goals.

## 2020-06-30 NOTE — PLAN OF CARE
Problem: Self-expression Impairment (Psychotic Signs/Symptoms)  Goal: Improved Self-Expression (Psychotic Signs/Symptoms)  Outcome: Improving    48 HOUR NURSING ASSESSMENT: Patient is assessed for suicidal risk and mental health symptoms. She is observed in the milieu interacting appropriately with staff and peers.  She attended and participated actively in group activities.  She denies SI, SIB, or HI thought, plan or intent and also denies hallucinations.  Her affect is flat/blunted and mood is depressed/anxious.  She rates depression at 5/10 and anxiety at 5/10.  She reports lower back and shoulder pain, 3/10, and reports she does stretches to relieve this discomfort and does not take meds for it.  BP was slightly elevated at 130/94 at 1614, other vitals WNL rechecked BP at 1945 and it was 133/81 and no concerns with intake and denies constipation.  Patient took evening medications and denies any known side effects.    Will continue with plan of care.      PRNS this shift Hydroxyzine, 25 mg, 1926, per patient request for anxiety.  Patient reported reduced anxiety at 1945.

## 2020-06-30 NOTE — PROGRESS NOTES
Pt had a mostly calm shift. Experienced anxiety early on in the shift. Expressed concerns to RN regarding new medication. No SIB/SI.

## 2020-07-01 PROCEDURE — H2032 ACTIVITY THERAPY, PER 15 MIN: HCPCS

## 2020-07-01 PROCEDURE — 25000132 ZZH RX MED GY IP 250 OP 250 PS 637: Mod: GY | Performed by: PSYCHIATRY & NEUROLOGY

## 2020-07-01 PROCEDURE — G0177 OPPS/PHP; TRAIN & EDUC SERV: HCPCS

## 2020-07-01 PROCEDURE — 12400001 ZZH R&B MH UMMC

## 2020-07-01 PROCEDURE — 99232 SBSQ HOSP IP/OBS MODERATE 35: CPT | Mod: 95 | Performed by: PSYCHIATRY & NEUROLOGY

## 2020-07-01 PROCEDURE — 25000132 ZZH RX MED GY IP 250 OP 250 PS 637: Mod: GY | Performed by: NURSE PRACTITIONER

## 2020-07-01 PROCEDURE — 90853 GROUP PSYCHOTHERAPY: CPT

## 2020-07-01 RX ORDER — ZIPRASIDONE HYDROCHLORIDE 60 MG/1
60 CAPSULE ORAL AT BEDTIME
Status: DISCONTINUED | OUTPATIENT
Start: 2020-07-01 | End: 2020-07-02

## 2020-07-01 RX ADMIN — DIPHENHYDRAMINE HYDROCHLORIDE 50 MG: 25 CAPSULE ORAL at 23:19

## 2020-07-01 RX ADMIN — HYDROXYZINE HYDROCHLORIDE 50 MG: 25 TABLET, FILM COATED ORAL at 23:04

## 2020-07-01 RX ADMIN — ZIPRASIDONE HCL 60 MG: 60 CAPSULE ORAL at 22:02

## 2020-07-01 RX ADMIN — MELATONIN TAB 3 MG 3 MG: 3 TAB at 22:02

## 2020-07-01 RX ADMIN — LORAZEPAM 1 MG: 1 TABLET ORAL at 23:19

## 2020-07-01 RX ADMIN — HALOPERIDOL 5 MG: 5 TABLET ORAL at 23:19

## 2020-07-01 RX ADMIN — HYDROXYZINE HYDROCHLORIDE 25 MG: 25 TABLET, FILM COATED ORAL at 14:27

## 2020-07-01 RX ADMIN — TOPIRAMATE 100 MG: 100 TABLET, FILM COATED ORAL at 08:50

## 2020-07-01 ASSESSMENT — ACTIVITIES OF DAILY LIVING (ADL)
DRESS: SCRUBS (BEHAVIORAL HEALTH)
ORAL_HYGIENE: INDEPENDENT
LAUNDRY: WITH SUPERVISION
DRESS: INDEPENDENT;SCRUBS (BEHAVIORAL HEALTH)
ORAL_HYGIENE: INDEPENDENT
HYGIENE/GROOMING: SHOWER;INDEPENDENT
HYGIENE/GROOMING: INDEPENDENT

## 2020-07-01 NOTE — PROGRESS NOTES
Work Completed:  The patient signed a housing attestation today.  Resent Court Recommendations to Court  as well. Spoke with patient's   about crisis placement in lieu of shelter one court hearing is completed and she is stable.    Discharge plan or goal:   Patient will be stabilized and outpatient providers looking at housing option in crisis rather than shelter.                  Barriers to discharge:      Complete court process and stabilize.

## 2020-07-01 NOTE — PLAN OF CARE
"48 hour nursing observation     Psychosis, unspecified psychosis type (H) [F29]  Bipolar affective disorder, remission status unspecified (H) [F31.9]    Admit Date: 6/21/2020    Patient evaluation continues. Assessed mood,anxiety,thoughts and behavior. Patient is slowly progressing towards goals. Patient is encouraged to participate in groups and assisted to develop healthy coping skills.      /81 (BP Location: Right arm)   Pulse 58   Temp 97.2  F (36.2  C) (Tympanic)   Resp 16   Wt 86.1 kg (189 lb 14.4 oz)   SpO2 100%   BMI 26.49 kg/m      Patient reports depression level of 3 and anxiety level of  3 with 10 being the worst. 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 Patient's affect is blunted at times but does brighten upon approach. Patient denies SI, SIB, HI, denies wishes to be dead & does feel hopeful about the future. Patient states concentration is \"not bad\" & denies racing thoughts.  Patient denies auditory or visual hallucinations & doesn't appear to be responding to internal stimuli. Patient reports sleeping 8 hours last night and appetite \"good\".      Patient is compliant with medications and side effects reported are none. Pt did request & received a prn Vistaril 25 mg at 1430. Patient attended groups, visible & social with some peers.  Pt has been pleasant & polite. ADLs are good, showered this AM.  VSS & pt states she does have some mild james shoulder & low back pain \"but stretching helps the best\". Pt is glad to hear that the hospital is recommending a Stay of Commitment.  Pt will have her final Court Hearing to [place] at 1000. Refer to daily team meeting notes for individualized plan of care. Nursing will continue to observe and assess.  Reinforce strengths, empowering behaviors, healthy coping skills & provide safe environment.        "

## 2020-07-01 NOTE — PROGRESS NOTES
"Pt is experiencing anxiety and Pt thinks it might be due to nicotine withdrawal, as Pt is not taking any nicotine while in the hospital. Writer asked if Pt was trying to quit smoking, Pt denied, stating she is taking things one days at a time and does not want to plan to quit \"If I make a plan and then have a cigarette when I leave I'll be disappointed in myself.\" Pt said today felt \"all over the place\" but could not offer specifics. Pt is experiencing low levels of depression and is feeling hopeful for court on 7/2. Pt denies SI/HI. Pt has been active in group and in the milieu.       07/01/20 1800   Behavioral Health   Hallucinations denies / not responding to hallucinations   Thinking intact   Orientation person: oriented;place: oriented;date: oriented;time: oriented   Memory baseline memory   Insight insight appropriate to situation   Judgement intact   Eye Contact at examiner   Affect full range affect   Mood mood is calm   Physical Appearance/Attire attire appropriate to age and situation   Hygiene well groomed   Suicidality other (see comments)  (Denies)   Wish to be Dead Description (Recent) no   Non-Specific Active Suicidal Thought Description (Recent) no   Coping/Psychosocial   Verbalized Emotional State anxiety;hopefulness   Plan of Care Reviewed With patient   Occupational Therapy   Type of Intervention structured groups   Response Participates   Hours 1   Activities of Daily Living   Hygiene/Grooming independent   Oral Hygiene independent   Dress scrubs (behavioral health)   Room Organization independent     "
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
## 2020-07-01 NOTE — PROGRESS NOTES
"Pt participated in dance/movement therapy (DMT) group themes of strength, resilience and rootedness.  She was expressive and inspirational, sharing the proverb: \"when the music changes, it changes the dance.\"       07/01/20 0930   Dance Movement Therapy   Type of Intervention structured groups   Response participates, initiates socially appropriate   Hours 1         "

## 2020-07-02 PROCEDURE — 99232 SBSQ HOSP IP/OBS MODERATE 35: CPT | Mod: 95 | Performed by: PSYCHIATRY & NEUROLOGY

## 2020-07-02 PROCEDURE — 25000132 ZZH RX MED GY IP 250 OP 250 PS 637: Mod: GY | Performed by: NURSE PRACTITIONER

## 2020-07-02 PROCEDURE — 25000132 ZZH RX MED GY IP 250 OP 250 PS 637: Mod: GY | Performed by: PSYCHIATRY & NEUROLOGY

## 2020-07-02 PROCEDURE — G0177 OPPS/PHP; TRAIN & EDUC SERV: HCPCS

## 2020-07-02 PROCEDURE — 12400001 ZZH R&B MH UMMC

## 2020-07-02 PROCEDURE — 90853 GROUP PSYCHOTHERAPY: CPT

## 2020-07-02 RX ORDER — CHLORAL HYDRATE 500 MG
1 CAPSULE ORAL DAILY
Status: DISCONTINUED | OUTPATIENT
Start: 2020-07-02 | End: 2020-07-06 | Stop reason: HOSPADM

## 2020-07-02 RX ORDER — ZIPRASIDONE HYDROCHLORIDE 60 MG/1
60 CAPSULE ORAL
Status: DISCONTINUED | OUTPATIENT
Start: 2020-07-02 | End: 2020-07-02

## 2020-07-02 RX ORDER — MULTIVITAMIN,THERAPEUTIC
1 TABLET ORAL DAILY
Status: DISCONTINUED | OUTPATIENT
Start: 2020-07-02 | End: 2020-07-06 | Stop reason: HOSPADM

## 2020-07-02 RX ORDER — ZIPRASIDONE HYDROCHLORIDE 60 MG/1
60 CAPSULE ORAL AT BEDTIME
Status: DISCONTINUED | OUTPATIENT
Start: 2020-07-02 | End: 2020-07-06 | Stop reason: HOSPADM

## 2020-07-02 RX ADMIN — TOPIRAMATE 100 MG: 100 TABLET, FILM COATED ORAL at 09:31

## 2020-07-02 RX ADMIN — MELATONIN TAB 3 MG 3 MG: 3 TAB at 22:16

## 2020-07-02 RX ADMIN — ZIPRASIDONE HCL 60 MG: 60 CAPSULE ORAL at 22:16

## 2020-07-02 RX ADMIN — THERA TABS 1 TABLET: TAB at 12:52

## 2020-07-02 RX ADMIN — Medication 1 G: at 12:54

## 2020-07-02 RX ADMIN — HYDROXYZINE HYDROCHLORIDE 50 MG: 25 TABLET, FILM COATED ORAL at 17:04

## 2020-07-02 ASSESSMENT — ACTIVITIES OF DAILY LIVING (ADL)
HYGIENE/GROOMING: INDEPENDENT
ORAL_HYGIENE: INDEPENDENT
ORAL_HYGIENE: INDEPENDENT
LAUNDRY: WITH SUPERVISION
LAUNDRY: WITH SUPERVISION
HYGIENE/GROOMING: INDEPENDENT
DRESS: SCRUBS (BEHAVIORAL HEALTH)
DRESS: SCRUBS (BEHAVIORAL HEALTH)

## 2020-07-02 NOTE — PROGRESS NOTES
07/02/20 1500   Psycho Education   Type of Intervention structured groups   Response participates, initiates socially appropriate   Hours 1   Patient interactive and supportive of other patients.  Calm, cooperative and accepting of the Stayed Order for Commitment and fact that she will be here until sometime next week as her [place] CM is now out of town until Monday and this writer needs to discuss the agreed upon court conditions with her and see if we can add any other resources.  Ivelisse did say she is interested in DBT so will provide this resource for her but she cannot enter a program until her MA is back in place.

## 2020-07-02 NOTE — PLAN OF CARE
Problem: OT General Care Plan  Goal: OT Goal 1  Description: Pt will practice using >2 coping strategies to manage stress and reduce symptoms to demonstrate increased readiness for discharge.     OT Groups Attended: Clinic     Affect/Hygiene/Presentation: Calm/pleasant, engaged, congruent affect. No apparent hygiene concerns.     Patient Performance/Response:  -Clinic: Pt continues to demonstrate adequate performance skills and patterns during participation in occupational therapy clinic groups. See prior notes for further details.     Plan: Pt will be encouraged to maintain group attendance for continued ongoing assessment and support in completion of occupational therapy treatment goals.     Outcome: Improving

## 2020-07-02 NOTE — PROVIDER NOTIFICATION
"Pt processed out of seclusion with blunted sad affect accompanied with 4 staff. Pt reports\" I know what is expected of me after being processed and I am not a child to be reminded\". Pt was reminded that she is expected to maintain safety to herself and staff. No injuries noted related to seclusion. Denied any side effects from medications. Pt escorted to her room and she sat on her bed.\"  "
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
## 2020-07-02 NOTE — PROGRESS NOTES
"Pt attended a couple groups. Pt had court TV at 1005; other than her complaining of poor sleep and her frustrations about not getting help for it, she was calm and agreeable. She will likely get a stay of commitment unless her behavior worsens in the hospital. Pt showered and changed scrubs. When asked if she were depressed she said, \"yeah\" but declined to rate it. Pt did not make any delusional statements.  Pt's appetite is adequate. Pt denies SI and SIB thoughts.     07/02/20 1335   Behavioral Health   Hallucinations denies / not responding to hallucinations   Thinking distractable   Orientation person: oriented;place: oriented;date: oriented;time: oriented   Memory baseline memory   Insight poor  (improving somewhat)   Judgement impaired   Eye Contact at examiner   Affect tense   Mood depressed;mood is calm   Physical Appearance/Attire appears stated age;attire appropriate to age and situation;neat   Hygiene well groomed   Suicidality   (denies)   1. Wish to be Dead (Recent) No   2. Non-Specific Active Suicidal Thoughts (Recent) No   Self Injury   (denies)   Elopement (WDL) WDL   Elopement Distress about legal situation (holds for mental health or criminal)   Activity (WDL) WDL   Speech coherent;clear   Medication Sensitivity no stated side effects;no observed side effects   Psychomotor / Gait steady;balanced   Overt Agression (WDL) WDL   Coping/Psychosocial   Verbalized Emotional State acceptance;hopefulness;depression;powerlessness   Safety   Suicidality Status 15   Elopement status 15   Activities of Daily Living   Hygiene/Grooming independent   Oral Hygiene independent   Dress scrubs (behavioral health)   Laundry with supervision   Room Organization independent     "

## 2020-07-02 NOTE — PROGRESS NOTES
"   07/01/20 1600   Group Therapy Session   Group Attendance attended group session   Total Time (minutes) 50   Group Type psychotherapeutic   Patient Participation/Contribution cooperative with task;discussed personal experience with topic;listened actively;offered helpful suggestions to peers   Psychotherapy group goal:  Identify and process feelings in-the-moment utilizing a \"feelings heart\" activity.     Ivelisse reports feeling \"hopeful.\" She presents in a pleasant, engaged mood. Congruent affect.  She actively participated in the activity, processed her current feelings, and engaged in the group discussion.   "

## 2020-07-02 NOTE — PROVIDER NOTIFICATION
"Another face to face assessment completed at this time and pt appears to be sleeping in the corner of the room. After pt approached with writer to see if she is ready to go to her room and sleep, pt replied \" Leave the fuck me alone.\" Pt educated the expectations before she can be processed out of seclusion. Will continue to monitor.   "

## 2020-07-02 NOTE — PROVIDER NOTIFICATION
07/01/20 2340   Seclusion or Restraint Order   In Person Face to Face Assessment Conducted Yes-Eval of pt's immediate situation, reaction to intervention, complete review of systems assessment, behavioral assessment & review/assessment of hx, drugs & meds, recent labs, etc, behavioral condition, need to continue/terminate restraint/seclusion   Patient Experienced No adverse physical outcome from seclusion/restraint initiation   Continuation of Seclus/Restraint indicated at this time Yes   In person face to face assessment conducted and no adverse physical outcome from restraint initiation  Observed or reported. Continuation of restraints is indicated at this time to prevent harm to self or others. The provider was notified of the results of the face to face.

## 2020-07-02 NOTE — PROGRESS NOTES
Work Completed:  Patient completed her MI court hearing with [facility].  She is now on a Stayed Order of Commitment and agreed  To the conditions listed in her Court Order.    Discharge plan or goal:   Stabilize mental health symptoms, return to community with psychiatric appointment, appoint with her Co CM, and patient's goal is  to return to work.                  Barriers to discharge:    Patient is on Stayed Order of Commitment and her [facility]  will not be available until   Monday, 7/6 to discuss discharge plan. Also patient has been labile, was in seclusion last night,   Reported her Geodon kept her up so she needs more stabilization.

## 2020-07-02 NOTE — PROVIDER NOTIFICATION
Pt noted to be sleeping another face to face completed at this time but patient refused to verbalize any safety measures once processed out of seclusion. Pt remains quiet and does not make any verbal response when asked any question. We will continue to monitor pt and offer processing out of seclusion when ready.

## 2020-07-02 NOTE — PROGRESS NOTES
"Patient completed her Final court hearing with [facility].  This writer told her that we need to wait for the court order to arrive and [facility] does have a treatment plan plan in place in conjunction with [facility] plan that she apparently agreed to per our Psych Associate who sat in with her. Patient was upset about the Geodon causing sleep disruption so doctor is now ordering it for evening mealtime. Spoke with her [facility]  Ruthie Melton.  Ruthie has not heard from court and she will be off starting tomorrow and not back until Monday 7/6.      Patient's commitment as MI was STAYED by [facility] and she agreed to the parameter listed in the Court Order with the first Condition (important) - \" a) That the Respondent remain hospitalized, cooperate with the treatment team [facility] until medically discharged, and follow all of the aftercare recommendations of the treatment team.\"                  "

## 2020-07-03 PROCEDURE — G0177 OPPS/PHP; TRAIN & EDUC SERV: HCPCS

## 2020-07-03 PROCEDURE — 25000132 ZZH RX MED GY IP 250 OP 250 PS 637: Mod: GY | Performed by: PSYCHIATRY & NEUROLOGY

## 2020-07-03 PROCEDURE — 99232 SBSQ HOSP IP/OBS MODERATE 35: CPT | Mod: 95 | Performed by: PSYCHIATRY & NEUROLOGY

## 2020-07-03 PROCEDURE — 12400001 ZZH R&B MH UMMC

## 2020-07-03 PROCEDURE — 90853 GROUP PSYCHOTHERAPY: CPT

## 2020-07-03 PROCEDURE — 25000132 ZZH RX MED GY IP 250 OP 250 PS 637: Mod: GY | Performed by: NURSE PRACTITIONER

## 2020-07-03 RX ADMIN — HYDROXYZINE HYDROCHLORIDE 25 MG: 25 TABLET, FILM COATED ORAL at 15:35

## 2020-07-03 RX ADMIN — MELATONIN TAB 3 MG 3 MG: 3 TAB at 22:01

## 2020-07-03 RX ADMIN — ZIPRASIDONE HCL 60 MG: 60 CAPSULE ORAL at 22:01

## 2020-07-03 RX ADMIN — Medication 1 G: at 09:30

## 2020-07-03 RX ADMIN — TOPIRAMATE 100 MG: 100 TABLET, FILM COATED ORAL at 09:30

## 2020-07-03 RX ADMIN — THERA TABS 1 TABLET: TAB at 09:30

## 2020-07-03 ASSESSMENT — ACTIVITIES OF DAILY LIVING (ADL)
LAUNDRY: WITH SUPERVISION
LAUNDRY: WITH SUPERVISION
ORAL_HYGIENE: INDEPENDENT
DRESS: SCRUBS (BEHAVIORAL HEALTH);INDEPENDENT
ORAL_HYGIENE: INDEPENDENT
DRESS: SCRUBS (BEHAVIORAL HEALTH)
HYGIENE/GROOMING: INDEPENDENT
HYGIENE/GROOMING: INDEPENDENT
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
## 2020-07-03 NOTE — PROGRESS NOTES
Pt spent most of the shift in her bed sleeping or reading. She has had a calm shift, interacting with select patients, but not many. Pt had full range affect throughout shift and during check-in. Pt denies any AH/VH. Attended afternoon group. Denies any SI/SIB. Still feels hopeful for discharge, but disappointed that she has to stay here through the weekend. States appetite has been increasing due to medications she is taking.       07/02/20 2121   Behavioral Health   Hallucinations denies / not responding to hallucinations;other (see comment)  (Pt denies)   Thinking intact   Orientation time: oriented;date: oriented;place: oriented;person: oriented   Memory baseline memory   Insight insight appropriate to situation;insight appropriate to events   Judgement impaired   Eye Contact at examiner   Affect full range affect   Mood mood is calm   Physical Appearance/Attire appears stated age;attire appropriate to age and situation   Hygiene well groomed   Suicidality chronic thoughts with no stated plan   1. Wish to be Dead (Recent) No   Wish to be Dead Description (Recent)   (Pt denies)   2. Non-Specific Active Suicidal Thoughts (Recent) No   Non-Specific Active Suicidal Thought Description (Recent)   (Pt denies)   3. Active Sucidal Ideation with any Methods (Not Plan) Without Intent to Act (Recent) No   Active Suicidal Ideation with any Methods (Not Plan) Description (Recent)   (Pt denies)   Self Injury other (see comment)  (Pt denies)   Elopement   (None observed)   Activity isolative;withdrawn   Speech clear;coherent   Medication Sensitivity other (see comment)  (Increased appetite)   Psychomotor / Gait balanced;steady   Activities of Daily Living   Hygiene/Grooming independent   Oral Hygiene independent   Dress scrubs (behavioral health)   Laundry with supervision   Room Organization independent

## 2020-07-03 NOTE — PROGRESS NOTES
"   07/03/20 1600   Group Therapy Session   Group Attendance attended group session   Total Time (minutes) 50   Group Type psychotherapeutic   Patient Participation/Contribution cooperative with task;listened actively;discussed personal experience with topic   Psychotherapy group goals: Build strengths and resilience by identifying positives about oneself \"I am, I have, I can\" and then process as a group.    Ivelisse reports feeling \"content.\" She presents in a pleasant mood. Mood brightened through discussion. Affect congruent.She calmly waited for other group members to get settled as she was the first to arrive. She actively participated in the group activities and discussion. She shared insight into her hesitancy to acknowledge complements from others or admit to feeling \"proud of herself\" for a skill, characteristic, behavior, etc which she does well. This led to a positive, engaging group discussion.    "

## 2020-07-03 NOTE — PROGRESS NOTES
Work Completed:  Discussed the conditions of the patient's Stay again with her.  Patient okay with our plan and understands that she cannot be discharged until Dr. Gonzales feels she is ready and her assigned [facility]  is back in town.  Patient has been compliant with medications and unit rules and protocol.  Encouraged her to keep attending programming. Reassured her that [facility] and [facility] are trying to reopen her MA.     Discharge plan or goal:   Patient will discharge to the community with outpatient services.  She plans to return to work.                Barriers to discharge:    Needs further stabilization of her mood and need to coordinate discharge tx plan with her [place] CM next week.

## 2020-07-03 NOTE — PROGRESS NOTES
"   07/02/20 1600   Group Therapy Session   Group Attendance attended group session   Total Time (minutes) 45   Group Type psychotherapeutic   Patient Participation/Contribution cooperative with task;discussed personal experience with topic;listened actively;offered helpful suggestions to peers   Patient participated in psychotherapy group which included a mindfulness activity focusing on the 5 senses and then processing as a group.    Ivelisse reports feeling \"tired but ok\" which she rated at a 6/10 (10 best). She actively engaged in the group activity and discussion. He mood brightened as she shared her thoughts, feelings, and memories regarding her favorite sights, sounds, etc. Positive social interaction. Homework: practice mindful handwashing, notice senses, and process during group tomorrow.  "

## 2020-07-03 NOTE — PLAN OF CARE
OT Groups Attended: Clinic     Affect/Hygiene/Presentation: Calm/pleasant, engaged, congruent affect. No apparent hygiene concerns.     Patient Performance/Response: Pt actively participated in occupational therapy clinic. Pt was able to ask for assistance as needed, and independently initiate a familiar, goal-directed task without difficulty. Pt demonstrated good focus and attention to detail during task completion. Pt appeared comfortable interacting with peers and writer, and occasionally initiated conversation during her time in clinic.    Plan: Pt will be encouraged to maintain group attendance for continued ongoing assessment and support in completion of occupational therapy treatment goals.
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
## 2020-07-04 PROCEDURE — 12400001 ZZH R&B MH UMMC

## 2020-07-04 PROCEDURE — 25000132 ZZH RX MED GY IP 250 OP 250 PS 637: Mod: GY | Performed by: PSYCHIATRY & NEUROLOGY

## 2020-07-04 PROCEDURE — 25000132 ZZH RX MED GY IP 250 OP 250 PS 637: Mod: GY | Performed by: NURSE PRACTITIONER

## 2020-07-04 RX ADMIN — Medication 1 G: at 09:11

## 2020-07-04 RX ADMIN — HYDROXYZINE HYDROCHLORIDE 50 MG: 25 TABLET, FILM COATED ORAL at 16:27

## 2020-07-04 RX ADMIN — TOPIRAMATE 100 MG: 100 TABLET, FILM COATED ORAL at 09:11

## 2020-07-04 RX ADMIN — ZIPRASIDONE HCL 60 MG: 60 CAPSULE ORAL at 20:00

## 2020-07-04 RX ADMIN — MELATONIN TAB 3 MG 3 MG: 3 TAB at 20:00

## 2020-07-04 RX ADMIN — THERA TABS 1 TABLET: TAB at 09:11

## 2020-07-04 ASSESSMENT — ACTIVITIES OF DAILY LIVING (ADL)
HYGIENE/GROOMING: INDEPENDENT;SHOWER
ORAL_HYGIENE: INDEPENDENT
DRESS: SCRUBS (BEHAVIORAL HEALTH)
LAUNDRY: UNABLE TO COMPLETE

## 2020-07-04 NOTE — PLAN OF CARE
"48 hour Nursing Observation    Diagnosis: Psychosis, unspecified psychosis type (H) [F29]  Bipolar affective disorder, remission status unspecified (H) [F31.9]  Admit Date: 6/21/2020  Precautions: Orders Placed This Encounter      Assault precautions      Single Room      Sexual precautions      Elopement precautions     Recent Vitals: /80   Pulse 73   Temp 98.6  F (37  C) (Oral)   Resp 16   Wt 86.1 kg (189 lb 14.4 oz)   SpO2 100%   BMI 26.49 kg/m  ,  , Sitting Orthostatic BP: 122/86, Standing Orthostatic BP: 113/80     General Shift Summary  Patient has been visible in milieu, social and laughing with peers, and participated in group. She presents as bright, cooperative, with a full range affect. Patient denied depression or anxiety, denies SI/HI/SIB/AVH. Patient does not appear to be responding to AVH. Patient stated she had a really good day and \"I'm feeling very hopeful when I get out of here. Everything with work, life, talking to my , I've found what programs help and what does not. I've realized DBT and group therapy is what I need, not 1:1 therapy.\". Patient voiced future plans for when she leaves and seemed motivated. Incite and judgement seem fair-good. Eating well. Hygiene is good.    She received PRN Vistaril 25mg at 1535 for anxiety stating that it helped to relieve symptoms.    Plan is to continue to monitor patient status q 15 mins, assess response to medications, and maintain the patients safety.    Arlin Rico RN MSN    "

## 2020-07-04 NOTE — PROGRESS NOTES
"Pt was very calm all shift. She isolated herself for half the shift then came out shower and got social with others. Pt goal for the day is to relax and take it easy for the weekend. Pt reported good concentration, sleep and appetite good with lower back and shoulder blade pain. She shared with staff that she is feeling \"very balanced\" She reported feeling so much better and just want to be able to get out of here and get on with her life. Pt stated that showering in the morning does help keep her centered and balanced. She denies SI/SIB, hallucinations and all symptoms of mental illness.     07/04/20 1434   Behavioral Health   Hallucinations denies / not responding to hallucinations   Thinking intact   Orientation person: oriented;place: oriented;date: oriented;time: oriented   Memory baseline memory   Insight insight appropriate to situation;insight appropriate to events   Judgement intact   Eye Contact at examiner   Affect other (see comments)  (Upbeat spirit)   Hygiene well groomed   1. Wish to be Dead (Recent) No   2. Non-Specific Active Suicidal Thoughts (Recent) No   Speech clear   Activities of Daily Living   Hygiene/Grooming independent;shower   Oral Hygiene independent   Dress scrubs (behavioral health)   Laundry unable to complete   Room Organization independent     "

## 2020-07-05 PROCEDURE — G0177 OPPS/PHP; TRAIN & EDUC SERV: HCPCS

## 2020-07-05 PROCEDURE — 25000132 ZZH RX MED GY IP 250 OP 250 PS 637: Mod: GY | Performed by: PSYCHIATRY & NEUROLOGY

## 2020-07-05 PROCEDURE — 25000132 ZZH RX MED GY IP 250 OP 250 PS 637: Mod: GY | Performed by: NURSE PRACTITIONER

## 2020-07-05 PROCEDURE — 12400001 ZZH R&B MH UMMC

## 2020-07-05 RX ADMIN — THERA TABS 1 TABLET: TAB at 09:04

## 2020-07-05 RX ADMIN — HYDROXYZINE HYDROCHLORIDE 50 MG: 25 TABLET, FILM COATED ORAL at 12:46

## 2020-07-05 RX ADMIN — HYDROXYZINE HYDROCHLORIDE 50 MG: 25 TABLET, FILM COATED ORAL at 21:59

## 2020-07-05 RX ADMIN — ZIPRASIDONE HCL 60 MG: 60 CAPSULE ORAL at 20:44

## 2020-07-05 RX ADMIN — MELATONIN TAB 3 MG 3 MG: 3 TAB at 20:44

## 2020-07-05 RX ADMIN — TOPIRAMATE 100 MG: 100 TABLET, FILM COATED ORAL at 09:03

## 2020-07-05 RX ADMIN — Medication 1 G: at 09:03

## 2020-07-05 ASSESSMENT — ACTIVITIES OF DAILY LIVING (ADL)
LAUNDRY: WITH SUPERVISION
DRESS: INDEPENDENT;SCRUBS (BEHAVIORAL HEALTH)
HYGIENE/GROOMING: INDEPENDENT
HYGIENE/GROOMING: INDEPENDENT
DRESS: SCRUBS (BEHAVIORAL HEALTH);INDEPENDENT
LAUNDRY: WITH SUPERVISION
ORAL_HYGIENE: INDEPENDENT
ORAL_HYGIENE: INDEPENDENT

## 2020-07-05 NOTE — PLAN OF CARE
Problem: Adult Inpatient Plan of Care  Goal: Plan of Care Review  Outcome: No Change     48 Hour Nursing Observation    6/21/2020     Psychosis, unspecified psychosis type (H) [F29]  Bipolar affective disorder, remission status unspecified (H) [F31.9]     /85 (BP Location: Left arm)   Pulse 73   Temp 98  F (36.7  C) (Oral)   Resp 16   Wt 86.1 kg (189 lb 14.4 oz)   SpO2 100%   BMI 26.49 kg/m       Patient received a PRN Hydroxyzine 50 mg at 1627 for anxiety and was effective. She requested to take her bed time medications and took them at 2000. Will continue to monitor and assess pt.

## 2020-07-05 NOTE — PROVIDER NOTIFICATION
"Pt has had an uneventful shift. Had a brief period where she was upset regarding inconsistent rules on whether she was allowed to use a sound machine or not. She was able to turn things around nicely. Her affect has been full range and bright, was overheard singing happily to self in the shower, \"singing helps change my mood from negative to positive.\". She was able to set goals and has achieve most of them so far. Reports anxiety and depression are at baseline which is about 5/10. Denies any psychotic symptoms or suicidal thoughts. Indicates sleep and appetite are pretty good. States medications have been helpful with no major s/e. Pt did shower this shift. She has been calm and social.       07/05/20 1137   Behavioral Health   Hallucinations denies / not responding to hallucinations   Thinking intact   Orientation person: oriented;place: oriented;date: oriented   Memory baseline memory   Insight insight appropriate to situation   Judgement intact   Eye Contact at examiner   Affect full range affect   Mood mood is calm   Physical Appearance/Attire attire appropriate to age and situation   Hygiene well groomed   Suicidality other (see comments)  (Pt currently denies, contract for safety on th unit)   1. Wish to be Dead (Recent) No   2. Non-Specific Active Suicidal Thoughts (Recent) No   3. Active Sucidal Ideation with any Methods (Not Plan) Without Intent to Act (Recent) No   4. Active Suicidal Ideation with Some Intent to Act, Without Specific Plan (Recent) No   5. Active Suicidal Ideation with Specific Plan and Intent (Recent) No   Duration (Lifetime) NA   Enviromental Risk Factors None   Self Injury other (see comment)  (Pt currently denies thoughts or urges)   Activity other (see comment)  (Visible, social and engage in activities)   Speech clear;coherent   Medication Sensitivity no observed side effects;no stated side effects   Psychomotor / Gait balanced;steady   Coping/Psychosocial   Verbalized Emotional " State hopefulness   Plan of Care Reviewed With patient   Patient Agreement with Plan of Care agrees   Safety   Assault status 15;private room;behavioral scrubs (estuardo);other (see comment)   Sexual status 15;private room;semi-private room;other (see comment)   Fall Risk Interventions   High fall risk medications prescribed? yes   Within arms reach in use No   If no, reason not within arms reach   (balanced and steady gait)   Transfer belt in use No   If no, reason not using transfer belt   (No issues with ambulation)   Fall precaution band in place? No (see comments)   Medication interventions check orthostatic blood pressure;patient and family education;patient up ad edi, no additional interventions needed   Additional interventions check orthostatic blood pressure   Violence Risk   Feels Like Hurting Others no   Previous Attempt to Harm Others no   Activities of Daily Living   Hygiene/Grooming independent   Oral Hygiene independent   Dress independent;scrubs (behavioral health)   Laundry with supervision  (Activity not completed this shift)   Room Organization independent   Activity   Activity Assistance Provided independent

## 2020-07-05 NOTE — PROGRESS NOTES
Pt has been visible in the milieu for much of the shift drawing and interacting with other pts. Pts goal was to journal, which she stated that she completed. Pt was talking with another pt and the other pt escalated and became verbally aggressive towards pt. Pt was agitated but left the situation to cool off. Pt demonstrated good insight in this scenario. Pt denies paranoia, psychotic symptoms, SI, SIB, HI, and racing thoughts. Pt reported depression and anxiety at 5/10. Pt stated that she felt balanced and that she felt like her normal self. Pt appears to have good concentration. Pt is hopeful for the next steps. Pt has good appetite. Pt reports difficulty falling asleep (reported to take ~2 hours), and pt stated that she had her medications earlier tonight in hopes of that improving.        07/04/20 2142   Behavioral Health   Hallucinations denies / not responding to hallucinations   Thinking intact   Orientation person: oriented;place: oriented;date: oriented;time: oriented   Memory baseline memory   Insight insight appropriate to situation;insight appropriate to events   Judgement intact   Eye Contact into space  (pt was drawing during check in)   Affect full range affect   Mood mood is calm   Physical Appearance/Attire attire appropriate to age and situation   Hygiene well groomed   Suicidality other (see comments)  (denies)   Self Injury other (see comment)  (denies)   Elopement   (none observed)   Activity other (see comment)  (visible in milieu, social with peers)   Speech clear;coherent   Medication Sensitivity no stated side effects;no observed side effects

## 2020-07-06 VITALS
TEMPERATURE: 97.9 F | DIASTOLIC BLOOD PRESSURE: 89 MMHG | WEIGHT: 193 LBS | RESPIRATION RATE: 16 BRPM | OXYGEN SATURATION: 100 % | HEART RATE: 97 BPM | BODY MASS INDEX: 26.92 KG/M2 | SYSTOLIC BLOOD PRESSURE: 130 MMHG

## 2020-07-06 PROCEDURE — G0177 OPPS/PHP; TRAIN & EDUC SERV: HCPCS

## 2020-07-06 PROCEDURE — 25000132 ZZH RX MED GY IP 250 OP 250 PS 637: Mod: GY | Performed by: PSYCHIATRY & NEUROLOGY

## 2020-07-06 PROCEDURE — 25000132 ZZH RX MED GY IP 250 OP 250 PS 637: Mod: GY | Performed by: NURSE PRACTITIONER

## 2020-07-06 PROCEDURE — 99238 HOSP IP/OBS DSCHRG MGMT 30/<: CPT | Mod: 95 | Performed by: PSYCHIATRY & NEUROLOGY

## 2020-07-06 RX ORDER — TOPIRAMATE 100 MG/1
100 TABLET, FILM COATED ORAL DAILY
Qty: 30 TABLET | Refills: 1 | Status: SHIPPED | OUTPATIENT
Start: 2020-07-06

## 2020-07-06 RX ORDER — CHLORAL HYDRATE 500 MG
1 CAPSULE ORAL DAILY
Qty: 30 CAPSULE | Refills: 0 | Status: SHIPPED | OUTPATIENT
Start: 2020-07-07

## 2020-07-06 RX ORDER — HYDROXYZINE HYDROCHLORIDE 25 MG/1
25-50 TABLET, FILM COATED ORAL EVERY 4 HOURS PRN
Qty: 60 TABLET | Refills: 0 | Status: SHIPPED | OUTPATIENT
Start: 2020-07-06

## 2020-07-06 RX ORDER — LANOLIN ALCOHOL/MO/W.PET/CERES
3 CREAM (GRAM) TOPICAL AT BEDTIME
Qty: 30 TABLET | Refills: 0 | Status: SHIPPED | OUTPATIENT
Start: 2020-07-06

## 2020-07-06 RX ORDER — MULTIVITAMIN,THERAPEUTIC
1 TABLET ORAL DAILY
Qty: 30 TABLET | Refills: 0 | Status: SHIPPED | OUTPATIENT
Start: 2020-07-07

## 2020-07-06 RX ORDER — ZIPRASIDONE HYDROCHLORIDE 60 MG/1
60 CAPSULE ORAL AT BEDTIME
Qty: 30 CAPSULE | Refills: 0 | Status: SHIPPED | OUTPATIENT
Start: 2020-07-06

## 2020-07-06 RX ORDER — OLANZAPINE 10 MG/1
5-10 TABLET ORAL 2 TIMES DAILY PRN
Qty: 30 TABLET | Refills: 0 | Status: SHIPPED | OUTPATIENT
Start: 2020-07-06

## 2020-07-06 RX ADMIN — THERA TABS 1 TABLET: TAB at 08:37

## 2020-07-06 RX ADMIN — HYDROXYZINE HYDROCHLORIDE 50 MG: 25 TABLET, FILM COATED ORAL at 10:52

## 2020-07-06 RX ADMIN — TOPIRAMATE 100 MG: 100 TABLET, FILM COATED ORAL at 08:37

## 2020-07-06 RX ADMIN — Medication 1 G: at 08:39

## 2020-07-06 NOTE — DISCHARGE INSTRUCTIONS
Behavioral Discharge Planning and Instructions      Summary:  You were admitted on 6/21/2020  due to severe chaka and anxiety.  You were treated by Dr. Allen Gonzales MD and discharged on 7/6/2020 from [facility] to friend or shelter.      The hospital filed a Petition for MI Commitment and you are now on a [facility] Order of Commitment.  You must follow all conditions of the Stay which you attested you would do during your court hearing. You were give a copy of your Stay to refer to.  Please stay in touch with Ruthie Melton, your .       Principal Diagnosis:   Bipolar Affective Disorder I; Manic with psychotic features.    Health Care Follow-up Appointments:   ELLEN Grimaldo (Medication Management) - Next telephone conference is on Thursday, August 6th at 11:40.  DBT Program - you will need to call them after discharge due to Covid 19 restrictions   [address]  [phone]  FAX: [phone]    [facility]--  [address], [place]  ARRIVE BY 1:30PM for the lottery.  Women now are currently housed at  [facility] located at [address] [place] [address] in [place].    Ruthie Melton [facility] : [phone]    Attend all scheduled appointments with your outpatient providers. Call at least 24 hours in advance if you need to reschedule an appointment to ensure continued access to your outpatient providers.   Major Treatments, Procedures and Findings:  You were provided with: a psychiatric assessment, assessed for medical stability, medication evaluation and/or management, group therapy and milieu management    Symptoms to Report: feeling more aggressive, increased confusion, losing more sleep or mood getting worse    Early warning signs can include: increased depression or anxiety sleep disturbances increased thoughts or behaviors of suicide or self-harm  increased unusual thinking, such  as paranoia or hearing voices    Safety and Wellness:  Take all medicines as directed.  Make no changes unless your doctor suggests them.      Follow treatment recommendations.  Refrain from alcohol and non-prescribed drugs.  If there is a concern for safety, call 911.    Resources:   Crisis Intervention: [phone] or [phone] (TTY: [phone]).  Call anytime for help.  [employer] [place] [employer]ness (www.[place].jania.org): 879.256.3182 or 752-890-6273.  [facility] Crisis Response [phone]  [facility] Crisis Response - Adult [phone]      The treatment team has appreciated the opportunity to work with you.     If you have any questions or concerns our unit number is [phone].

## 2020-07-06 NOTE — PLAN OF CARE
Patient is alert and oriented. She was seen by the doctor and she is discharging today. Writer met with the patient, she is calm and cooperative. Full range affect. She denies depression. Endorses anxiety because she is discharging today. She was given prn hydroxyzine 50 mg with good relief. Writer went through discharge instructions with patient. She verbalizes understanding of the instructions. She denies any SI/SIB/AVH/HI. She denies any access to guns. Her coping skills include writing, meditation and taking her medications. Her family, friends and her  are her support system. She feels safe in the hospital and is contracts for  safety on unit. Patient will discharge home with all her belongings. She will pick her prescribed medications from [facility] Patient was cabbed home and she left the unit with all her belongings accompanied by one of the staff. No complains or concerns from patient. She safely left the unit and cab picked her from outside [facility].

## 2020-07-06 NOTE — PROGRESS NOTES
Work Completed:  Patient has outpatient plan in place with appointments and resources. Spoke to the patient's [place] ERIN Melton to let her know about the discharge today. Met with patient who has been compliant with all care and taking medications as prescribed. Contracts for safety.  Cabbing her to last address so she can  her car.    Discharge plan or goal:  Return to last address to  car.  Return to work.                  Barriers to discharge:     None

## 2020-07-06 NOTE — PLAN OF CARE
"  Problem: Adult Inpatient Plan of Care  Goal: Plan of Care Review  Outcome: No Change     48 Hour Nursing Observation    6/21/2020     Psychosis, unspecified psychosis type (H) [F29]  Bipolar affective disorder, remission status unspecified (H) [F31.9]   /85   Pulse 71   Temp 97.6  F (36.4  C) (Oral)   Resp 16   Wt 87.5 kg (193 lb)   SpO2 100%   BMI 26.92 kg/m       Patient was visible in the milieu majority of the shift. She attended groups this afternoon. She had been coloring and having conversations with other peers. She has a full bright affect. Ate well at supper time. Later after supper, her voice was loud, having an argumentation with another female peer. Staff redirected this patient and she was labile, irritable while at the same time laughing. It took time to prompt her to stop and then eventually she did. She was back coloring then. Writer checked in with her and she was tensed and irritable. Answering questions all ,\"no comments.\" And she said she already completed her goal which was coloring. She denied SI, SIB, HI, hallucinations. No concerns regarding sleep, pain and side effects of medications. She wanted to talk with the nurse who redirected her to stop being loud earlier and the nurse did have a conversation with her in her room. All things were clarified and pt went back to the dining room, coloring. She requested to take her bed time meds and was given to her at 2044. At past 2100, she had a phone call to her mother and her voice was escalating again. She was agitated while talking with her mother. Her mother placed a call to the nurses' station and she wanted to be updated with patient's status however she was not listed in pt's RAND. Patient signed an RAND for her mother at 2200. Writer offered a PRN Zyprexa but refused to take it. She stated she wanted the Hydroxyzine instead. PRN Hydroxyzine 50 mg was given. After taking her medication, she went to her bedroom. Will continue to " monitor and assess pt.

## 2020-07-06 NOTE — PLAN OF CARE
"  OT Groups Attended: Clinic, Mindfulness     Affect/Hygiene/Presentation: Calm/pleasant, engaged, bright affect. No apparent hygiene concerns.     Patient Performance/Response:  -Clinic: Pt actively participated in occupational therapy clinic. Pt was able to ask for assistance as needed, and independently initiate a novel, goal-directed task with minimal assistance for task organization. Pt demonstrated good focus, planning, and attention to detail during task completion. Pt appeared comfortable interacting with peers and writer, and was observed to brighten with social interaction.   -Mindfulness: Pt actively participated in a mindfulness group focusing on reduction of anxiety, improvement of mood, and increase in concentration. The mindfulness practice was offered via supportive approaches including a gratitude exercise, deep breathing, stretching, essential oils, and guided inner peace meditation. Pt verbalized feeling \"grateful\" at the end of the group, and was able to remain focused for the full duration.     Plan: Pt will be encouraged to maintain group attendance for continued ongoing assessment and support in completion of occupational therapy treatment goals.     "

## 2020-07-06 NOTE — PLAN OF CARE
OT Groups Attended: Clinic    Affect/Hygiene/Presentation: Calm/pleasant, engaged, congruent affect. No apparent hygiene concerns.     Patient Performance/Response: Pt actively participated in occupational therapy clinic. Pt was able to ask for assistance as needed, and independently return to a novel, goal-directed task with minimal assistance for task setup. Pt demonstrated good focus, planning, and attention to detail during task completion. Pt appeared comfortable interacting with peers and writer, and occasionally socialized during her time in group.    Plan: Pt will be encouraged to maintain group attendance for continued ongoing assessment and support in completion of occupational therapy treatment goals.

## 2020-07-08 NOTE — DISCHARGE SUMMARY
Admit Date:     06/21/2020   Discharge Date:     07/06/2020      The patient was hospitalized at this facility between 06/22, discharged on 07/06/2020.  On the day of discharge, the patient was seen between 9:00 and 9:30 a.m. via telemedicine ([employer]).  The patient was hospitalized at [facility] station 10 and this provider was at his home office in front of his computer.  Reason for tele visit was explained to the patient (COVID-19 pandemic). The patient consented to it.      CHIEF COMPLAINT AND REASON FOR HOSPITALIZATION:  The patient is a 34-year-old -American female who was initially admitted to [facility] station 32 on a 72-hour hold through emergency room because of agitation and psychosis.  The police were called to her extended stay hotel due to her causing a disturbance.  The patient refused to leave, necessitating the presence of 7 police officers.  She was negative at the time, covered in a blanket.  EMS reported that the patient was holding her head, reported hearing loud voices.  In the emergency room, she was extremely agitated, yelling, rearranging objects in the room.  She vomited in a paper bag and threw it on the floor, made sexually inappropriate comments to male staff.  At least 2 codes, 21s, were called in the emergency room and she was medicated IM with Zyprexa 2 times.  Upon admission to the station 32, the patient was clearly agitated, yelling, had to be escorted to seclusion, given IM Haldol, Ativan and Benadryl.  For more details about the patient's presentation and past psychiatric history, please refer to Joie Zamorano nurse practitioner's note from 06/22/2020.       Discharge DIAGNOSES:  Bipolar affective disorder type 1, manic, severe with psychosis; cannabis use disorder; borderline personality trait versus disorder is highly likely.      CONSULTS:  There were no consults performed during this hospital stay.      LABORATORY  WORK:  Comprehensive metabolic battery showed elevated chloride 113, albumin was low at 2.9, total protein was also decreased at 6.6.  The rest of the test was unremarkable.  Fasting lipid panel was within normal limits.  Glucose was 120 on 06/23 and on 06/30 it was 68.  CBC with differential was completely unremarkable.  COVID-19 test was negative.  Urine drug screen positive for cannabis and negative for the rest of screened substances.  Urine pregnancy test was negative.      HOSPITAL COURSE:  The patient was admitted and initially followed by Joie Zamorano, clinical nurse practitioner, station 32, presented as minimally cooperative, had quite a lot of agitation and had to be medicated.  The patient was continued by Joie Zamorano on a 72-hour hold.  Lamictal was discontinued.  Because of patient's poor cooperation for petition for commitment and Soto, was submitted for Soto medication such as Haldol, Geodon, Latuda, Abilify, Seroquel and Zyprexa.  It was submitted to [facility].  The patient stayed on station 32 during first half of her hospital stay.  Was also followed by Dr. Vaibhav Ortez and was initially treated with Zyprexa.  Because of significant sedation, medication was discontinued.  The patient also reports that she was very scared of diabetes, as several family members have it.  Was continued with topiramate.  LAMOTRIGINE WAS ADDED TO THE LIST OF HER ALLERGIES, AS SHE REPORTED A RASH and was offered Seroquel p.r.n.  Appears to be making some progress.  She got agitated with another patient who has a history of being intrusive and was bothering Ivelisse.  Ivelisse was not able to redirect well and was transferred to station 10 under my care.  I will continue to follow the patient throughout the rest of her hospital stay.  The patient presented to the visit with (especially initially) a rather guarded and irritable attitude.  She denied suicidal or homicidal ideations, thoughts.  Denied auditory or  visual hallucinations.  Clearly did not want to talk about the circumstances of her admission.  Said that talking about it was traumatizing and she did not want to be retraumatized.  Her mental health symptoms are even worse lately because we were close to Father's Day, and her father was still in [place].  Was taking medications.  We discussed that we are switching her to Geodon as Geodon has more weight neutral medications than Seroquel and Zyprexa.  After a pretty lengthy discussion, she agreed to try it.  She was initially started on 40 mg and dose was increased to 60 mg a day.  She repeatedly said that she would take her medication because she agreed that she has bipolar and would need to be medicated.  Because of improved cooperation, the patient was put on a stay of commitment.  She immediately requested to be discharged, but agreed to stay in the hospital after she promised to cooperate with treatment team.  She said that she would like to go to DBT after discharge.  Repeatedly denied presence of suicidal or homicidal thoughts throughout the whole hospital stay.  Did not voice any delusional ideas.  Mood appeared to be labile, though she openly admitted that she easily gets angered and said that her anger could be because of panic attacks and requested Ativan.  She appeared to be offended and left after I told her that Ativan was a controlled substance and would not be recommended for someone who has chemical addiction issues.  She, however, appeared to be more comfortable and cooperative the next day.        She was discharged back to the community on the following medications:  Fish oil 1 gram daily, hydroxyzine 25-50 mg every 4 hours as needed for anxiety, melatonin 3 mg at bedtime, multivitamins 1 tablet daily, Zyprexa 5-10 mg 2 times a day p.r.n. for agitation, medication was added per patient's request to help her deal with anger outbursts, Topamax 100 mg daily, Geodon 60 mg at bedtime.        We  will continue to follow-up with her outpatient psychiatric prescriber, clinical nurse practitioner Polo Grimaldo.  Next telephone visit will be on  at 11:40.  The patient was recommended to call for DBT program at [facility] in [place], and was provided with address of [facility], was recommended to arrive by 1:30 for lottery.  Will continue to work with Elizabeth Melton from [facility] .         TEE CALZADA MD             D: 2020   T: 2020   MT: JOSE      Name:     BRETT PINON   MRN:      [id number]        Account:        [id number]   :      1985           Admit Date:     2020                                  Discharge Date: 2020      Document: [id number]

## 2020-10-13 ENCOUNTER — TELEPHONE (OUTPATIENT)
Dept: BEHAVIORAL HEALTH | Facility: CLINIC | Age: 35
End: 2020-10-13

## 2020-10-19 ENCOUNTER — TELEPHONE (OUTPATIENT)
Dept: BEHAVIORAL HEALTH | Facility: CLINIC | Age: 35
End: 2020-10-19

## 2020-10-19 NOTE — TELEPHONE ENCOUNTER
"Tried calling patient multiple times to check them in for their MH eval today, 10/19/2020, at 0800. Called and received a message that said \"you must first dial a 1 when calling this number\". Tried calling again with a 1, but still received the same message.  "

## 2021-02-11 ENCOUNTER — OFFICE VISIT (OUTPATIENT)
Dept: FAMILY MEDICINE | Facility: CLINIC | Age: 36
End: 2021-02-11
Payer: MEDICAID

## 2021-02-11 VITALS
SYSTOLIC BLOOD PRESSURE: 126 MMHG | OXYGEN SATURATION: 99 % | WEIGHT: 190.6 LBS | DIASTOLIC BLOOD PRESSURE: 82 MMHG | TEMPERATURE: 98.1 F | BODY MASS INDEX: 26.68 KG/M2 | HEART RATE: 72 BPM | HEIGHT: 71 IN

## 2021-02-11 DIAGNOSIS — N76.0 BACTERIAL VAGINOSIS: ICD-10-CM

## 2021-02-11 DIAGNOSIS — B96.89 BACTERIAL VAGINOSIS: ICD-10-CM

## 2021-02-11 DIAGNOSIS — N89.8 VAGINAL ODOR: ICD-10-CM

## 2021-02-11 DIAGNOSIS — Z00.00 ROUTINE GENERAL MEDICAL EXAMINATION AT A HEALTH CARE FACILITY: Primary | ICD-10-CM

## 2021-02-11 DIAGNOSIS — Z11.59 NEED FOR HEPATITIS C SCREENING TEST: ICD-10-CM

## 2021-02-11 DIAGNOSIS — F17.210 CIGARETTE NICOTINE DEPENDENCE WITHOUT COMPLICATION: ICD-10-CM

## 2021-02-11 LAB
BASOPHILS # BLD AUTO: 0 10E9/L (ref 0–0.2)
BASOPHILS NFR BLD AUTO: 0.2 %
DIFFERENTIAL METHOD BLD: NORMAL
EOSINOPHIL # BLD AUTO: 0 10E9/L (ref 0–0.7)
EOSINOPHIL NFR BLD AUTO: 0.6 %
ERYTHROCYTE [DISTWIDTH] IN BLOOD BY AUTOMATED COUNT: 14.5 % (ref 10–15)
HBA1C MFR BLD: 5.4 % (ref 0–5.6)
HCT VFR BLD AUTO: 40.9 % (ref 35–47)
HCV AB SERPL QL IA: NONREACTIVE
HGB BLD-MCNC: 13.5 G/DL (ref 11.7–15.7)
LYMPHOCYTES # BLD AUTO: 1.4 10E9/L (ref 0.8–5.3)
LYMPHOCYTES NFR BLD AUTO: 28.5 %
MCH RBC QN AUTO: 29.2 PG (ref 26.5–33)
MCHC RBC AUTO-ENTMCNC: 33 G/DL (ref 31.5–36.5)
MCV RBC AUTO: 89 FL (ref 78–100)
MONOCYTES # BLD AUTO: 0.4 10E9/L (ref 0–1.3)
MONOCYTES NFR BLD AUTO: 7.1 %
NEUTROPHILS # BLD AUTO: 3.2 10E9/L (ref 1.6–8.3)
NEUTROPHILS NFR BLD AUTO: 63.6 %
PLATELET # BLD AUTO: 228 10E9/L (ref 150–450)
RBC # BLD AUTO: 4.62 10E12/L (ref 3.8–5.2)
SPECIMEN SOURCE: ABNORMAL
WBC # BLD AUTO: 5 10E9/L (ref 4–11)
WET PREP SPEC: ABNORMAL

## 2021-02-11 PROCEDURE — 83036 HEMOGLOBIN GLYCOSYLATED A1C: CPT | Performed by: FAMILY MEDICINE

## 2021-02-11 PROCEDURE — 99213 OFFICE O/P EST LOW 20 MIN: CPT | Mod: 25 | Performed by: FAMILY MEDICINE

## 2021-02-11 PROCEDURE — 87210 SMEAR WET MOUNT SALINE/INK: CPT | Performed by: FAMILY MEDICINE

## 2021-02-11 PROCEDURE — 85025 COMPLETE CBC W/AUTO DIFF WBC: CPT | Performed by: FAMILY MEDICINE

## 2021-02-11 PROCEDURE — 36415 COLL VENOUS BLD VENIPUNCTURE: CPT | Performed by: FAMILY MEDICINE

## 2021-02-11 PROCEDURE — 99395 PREV VISIT EST AGE 18-39: CPT | Performed by: FAMILY MEDICINE

## 2021-02-11 PROCEDURE — 80053 COMPREHEN METABOLIC PANEL: CPT | Performed by: FAMILY MEDICINE

## 2021-02-11 PROCEDURE — 86803 HEPATITIS C AB TEST: CPT | Performed by: FAMILY MEDICINE

## 2021-02-11 RX ORDER — METRONIDAZOLE 500 MG/1
500 TABLET ORAL 2 TIMES DAILY
Qty: 14 TABLET | Refills: 0 | Status: SHIPPED | OUTPATIENT
Start: 2021-02-11 | End: 2021-02-18

## 2021-02-11 RX ORDER — NICOTINE 21 MG/24HR
1 PATCH, TRANSDERMAL 24 HOURS TRANSDERMAL EVERY 24 HOURS
Qty: 28 PATCH | Refills: 3 | Status: SHIPPED | OUTPATIENT
Start: 2021-02-11

## 2021-02-11 ASSESSMENT — ENCOUNTER SYMPTOMS
CONSTIPATION: 0
ARTHRALGIAS: 0
NAUSEA: 0
HEADACHES: 0
SORE THROAT: 0
PARESTHESIAS: 0
MYALGIAS: 1
HEMATURIA: 0
COUGH: 0
SHORTNESS OF BREATH: 0
ABDOMINAL PAIN: 0
DIZZINESS: 0
EYE PAIN: 0
NERVOUS/ANXIOUS: 1
BREAST MASS: 0
JOINT SWELLING: 0
DIARRHEA: 0
PALPITATIONS: 0
HEMATOCHEZIA: 0
HEARTBURN: 0
WEAKNESS: 1
FREQUENCY: 0
DYSURIA: 0
CHILLS: 0
FEVER: 0

## 2021-02-11 ASSESSMENT — ACTIVITIES OF DAILY LIVING (ADL): CURRENT_FUNCTION: NO ASSISTANCE NEEDED

## 2021-02-11 ASSESSMENT — MIFFLIN-ST. JEOR: SCORE: 1650.69

## 2021-02-11 NOTE — LETTER
February 15, 2021      Ivelisse MCDERMOTT Biorduot  [address] [place] [address] [address]   [place] [address]        Dear ,    We are writing to inform you of your test results.    All labs are within normal limits and do not require further investigation.   For further questions or concerns please let us know.     Resulted Orders   Wet prep   Result Value Ref Range    Specimen Description Vagina     Wet Prep Moderate  Clue cells seen   (A)     Wet Prep Few  WBC'S seen       Wet Prep No yeast seen     Wet Prep No Trichomonas seen    CBC with platelets and differential   Result Value Ref Range    WBC 5.0 4.0 - 11.0 10e9/L    RBC Count 4.62 3.8 - 5.2 10e12/L    Hemoglobin 13.5 11.7 - 15.7 g/dL    Hematocrit 40.9 35.0 - 47.0 %    MCV 89 78 - 100 fl    MCH 29.2 26.5 - 33.0 pg    MCHC 33.0 31.5 - 36.5 g/dL    RDW 14.5 10.0 - 15.0 %    Platelet Count 228 150 - 450 10e9/L    % Neutrophils 63.6 %    % Lymphocytes 28.5 %    % Monocytes 7.1 %    % Eosinophils 0.6 %    % Basophils 0.2 %    Absolute Neutrophil 3.2 1.6 - 8.3 10e9/L    Absolute Lymphocytes 1.4 0.8 - 5.3 10e9/L    Absolute Monocytes 0.4 0.0 - 1.3 10e9/L    Absolute Eosinophils 0.0 0.0 - 0.7 10e9/L    Absolute Basophils 0.0 0.0 - 0.2 10e9/L    Diff Method Automated Method    Comprehensive metabolic panel (BMP + Alb, Alk Phos, ALT, AST, Total. Bili, TP)   Result Value Ref Range    Sodium 140 133 - 144 mmol/L    Potassium 4.0 3.4 - 5.3 mmol/L    Chloride 113 (H) 94 - 109 mmol/L    Carbon Dioxide 24 20 - 32 mmol/L    Anion Gap 3 3 - 14 mmol/L    Glucose 97 70 - 99 mg/dL    Urea Nitrogen 12 7 - 30 mg/dL    Creatinine 1.04 0.52 - 1.04 mg/dL    GFR Estimate 69 >60 mL/min/[1.73_m2]      Comment:      Non  GFR Calc  Starting 12/18/2018, serum creatinine based estimated GFR (eGFR) will be   calculated using the Chronic Kidney Disease Epidemiology Collaboration   (CKD-EPI) equation.      GFR Estimate If Black 80 >60 mL/min/[1.73_m2]      Comment:        American GFR Calc  Starting 12/18/2018, serum creatinine based estimated GFR (eGFR) will be   calculated using the Chronic Kidney Disease Epidemiology Collaboration   (CKD-EPI) equation.      Calcium 9.3 8.5 - 10.1 mg/dL    Bilirubin Total 0.3 0.2 - 1.3 mg/dL    Albumin 3.3 (L) 3.4 - 5.0 g/dL    Protein Total 7.1 6.8 - 8.8 g/dL    Alkaline Phosphatase 92 40 - 150 U/L    ALT 23 0 - 50 U/L    AST 9 0 - 45 U/L   Hemoglobin A1c   Result Value Ref Range    Hemoglobin A1C 5.4 0 - 5.6 %      Comment:      Normal <5.7% Prediabetes 5.7-6.4%  Diabetes 6.5% or higher - adopted from ADA   consensus guidelines.     Hepatitis C Screen Reflex to HCV RNA Quant and Genotype   Result Value Ref Range    Hepatitis C Antibody Nonreactive NR^Nonreactive      Comment:      Assay performance characteristics have not been established for newborns,   infants, and children         If you have any questions or concerns, please call the clinic at the number listed above.       Sincerely,      Lissa Jimenez MD/rd
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
## 2021-02-12 ENCOUNTER — OFFICE VISIT (OUTPATIENT)
Dept: OBGYN | Facility: CLINIC | Age: 36
End: 2021-02-12
Payer: MEDICAID

## 2021-02-12 VITALS
DIASTOLIC BLOOD PRESSURE: 78 MMHG | BODY MASS INDEX: 26.6 KG/M2 | HEART RATE: 76 BPM | HEIGHT: 71 IN | WEIGHT: 190 LBS | SYSTOLIC BLOOD PRESSURE: 114 MMHG

## 2021-02-12 DIAGNOSIS — Z30.46 ENCOUNTER FOR REMOVAL AND REINSERTION OF NEXPLANON: Primary | ICD-10-CM

## 2021-02-12 DIAGNOSIS — Z30.46 SURVEILLANCE OF PREVIOUSLY PRESCRIBED IMPLANTABLE SUBDERMAL CONTRACEPTIVE: ICD-10-CM

## 2021-02-12 LAB
ALBUMIN SERPL-MCNC: 3.3 G/DL (ref 3.4–5)
ALP SERPL-CCNC: 92 U/L (ref 40–150)
ALT SERPL W P-5'-P-CCNC: 23 U/L (ref 0–50)
ANION GAP SERPL CALCULATED.3IONS-SCNC: 3 MMOL/L (ref 3–14)
AST SERPL W P-5'-P-CCNC: 9 U/L (ref 0–45)
BILIRUB SERPL-MCNC: 0.3 MG/DL (ref 0.2–1.3)
BUN SERPL-MCNC: 12 MG/DL (ref 7–30)
CALCIUM SERPL-MCNC: 9.3 MG/DL (ref 8.5–10.1)
CHLORIDE SERPL-SCNC: 113 MMOL/L (ref 94–109)
CO2 SERPL-SCNC: 24 MMOL/L (ref 20–32)
CREAT SERPL-MCNC: 1.04 MG/DL (ref 0.52–1.04)
GFR SERPL CREATININE-BSD FRML MDRD: 69 ML/MIN/{1.73_M2}
GLUCOSE SERPL-MCNC: 97 MG/DL (ref 70–99)
POTASSIUM SERPL-SCNC: 4 MMOL/L (ref 3.4–5.3)
PROT SERPL-MCNC: 7.1 G/DL (ref 6.8–8.8)
SODIUM SERPL-SCNC: 140 MMOL/L (ref 133–144)

## 2021-02-12 PROCEDURE — 11983 REMOVE/INSERT DRUG IMPLANT: CPT | Performed by: OBSTETRICS & GYNECOLOGY

## 2021-02-12 ASSESSMENT — MIFFLIN-ST. JEOR: SCORE: 1647.96

## 2021-02-12 NOTE — NURSING NOTE
"Chief Complaint   Patient presents with     Contraception     Nexplanon replacement       Initial /78   Pulse 76   Ht 1.803 m (5' 11\")   Wt 86.2 kg (190 lb)   LMP 01/28/2021 (Exact Date)   Breastfeeding No   BMI 26.50 kg/m   Estimated body mass index is 26.5 kg/m  as calculated from the following:    Height as of this encounter: 1.803 m (5' 11\").    Weight as of this encounter: 86.2 kg (190 lb).  BP completed using cuff size: regular    Questioned patient about current smoking habits.  Pt. currently smokes.  Advised about smoking cessation.      No obstetric history on file.    The following HM Due: NONE      The following patient reported/Care Every where data was sent to:  P ABSTRACT QUALITY INITIATIVES [[id number]]  Rosalva Sung LPN             "

## 2021-02-12 NOTE — PROGRESS NOTES
"  CC: removal of Nexplanon and reinsertion of new Nexplanon    HPI:   Ivelisse Castillo is a 35 year old who presents to clinic today to have her Nexplanon removed. She would like it replaced today for continued contraception. She has been happy with it, is not experiencing any bothersome side effects. It was inserted 5 years ago, and she was told at 3 years that it would have to be removed under anesthesia as it was \"not in the right place\". I saw her briefly during her visit with her primary care provider in [place] yesterday, and I could palpate the implant, though it was superior to the usual insertion spot in her left side arm.    Reviewed PMH, PSH, social and family history. Changes made in EPIC as needed.    OBJECTIVE:  Vitals:    02/12/21 1446   BP: 114/78   Pulse: 76   Weight: 86.2 kg (190 lb)   Height: 1.803 m (5' 11\")       Gen: alert, oriented, no distress, very pleasant    PROCEDURE:  Verbal and written consent obtained. Discussed risk of bleeding, infection, inability to remove device and bruising. Nexplanon device was palpated in her left side arm. The area was cleansed with betadine x3. Sterile gloves were donned. Then lidocaine 1% was injected under the skin at the distal end of the device. A 2mm incision was made with a scalpel over the distal end of the device, then the device was grasped with a sterile Maria Esther clamp. The fibrous sheath encasing the Nexplanon was incised with a scalpel, then the device was removed without difficulty, intact.    Additional 3cc of 1% lidocaine was injected along the insertion tract at the usual insertion spot.  Next the nexplanon was inserted without difficulty in the recommended fashion.  The device was removed and the implant was both visualized and palpated by myself. The incision was covered with steri-strips and the arm was wrapped with a pressure dressing. Patient tolerated procedure well.    ASSESSMENT:  Ivelisse Castillo is a 35 year old for nexplanon " removal and reinsertion of a new device.    PLAN:   Advised since the implant has , she should not rely on the new one for birth control for 1 week. She is not currently sexually active.    Patient to return to clinic for PRN.    Love Cano MD

## 2021-02-15 NOTE — RESULT ENCOUNTER NOTE
Please send patient a letter to let them know results are normal.    Please find attached the lab results from your recent physical.   All labs are within normal limits and do not require further investigation.   For further questions or concerns please let us know.     Best Wishes,    Dr. Velázquez

## 2021-08-10 ENCOUNTER — OFFICE VISIT (OUTPATIENT)
Dept: FAMILY MEDICINE | Facility: CLINIC | Age: 36
End: 2021-08-10
Payer: MEDICAID

## 2021-08-10 VITALS
SYSTOLIC BLOOD PRESSURE: 130 MMHG | BODY MASS INDEX: 28.85 KG/M2 | HEIGHT: 70 IN | OXYGEN SATURATION: 99 % | DIASTOLIC BLOOD PRESSURE: 88 MMHG | WEIGHT: 201.5 LBS | TEMPERATURE: 98.3 F | HEART RATE: 85 BPM

## 2021-08-10 DIAGNOSIS — Z30.46 ENCOUNTER FOR NEXPLANON REMOVAL: Primary | ICD-10-CM

## 2021-08-10 PROCEDURE — 11982 REMOVE DRUG IMPLANT DEVICE: CPT | Performed by: FAMILY MEDICINE

## 2021-08-10 PROCEDURE — 99207 PR DROP WITH A PROCEDURE: CPT | Performed by: FAMILY MEDICINE

## 2021-08-10 ASSESSMENT — MIFFLIN-ST. JEOR: SCORE: 1676.31

## 2021-08-10 NOTE — PROGRESS NOTES
Nexplanon Removal:     Is a pregnancy test required: No.  Was a consent obtained?  Yes    Ivelisse Castillo is here for removal of etonogestrel implant Nexplanon/Implanon    Indication: patient no longer desires to have implant. Feels it is making her gain weight.       Preoperative Diagnosis: etonogestrel implant  Postoperative Diagnosis: etonogestrel implant removed    Technique: On the left arm  Skin prep Betadine  Anesthesia 1% lidocaine, with epi  Procedure: Small incision (<5mm) was made at distal end of palpable implant, curved hemostat or mosquito forceps was used to isolate the implant and bring it to the incision, the fibrous capsule containing the implant  was incised and the Implant was removed intact.        EBL: minimal  Complications:  No  Tolerance:  Pt tolerated procedure well and was in stable condition.   Dressing:    A pressure bandage was placed for the next 12-24 hours.    Contraception was discussed and patient chose the following method none      Follow up: Pt was instructed to call if bleeding, severe pain or foul smell.     Lissa Jimenez MD

## 2021-08-10 NOTE — PATIENT INSTRUCTIONS
NEXPLANON AFTERCARE INSTRUCTIONS   1. You may have some pain at the site of the Nexplanon removal. You can help relieve the discomfort with Tylenol (acetaminophen), Aspirin or Advil (ibuprofen). If your discomfort worsens or you notice redness spreading on the skin around the insertion site, please call the clinic.   2. use another method of birth control, like condoms until you decide on a new form of birth control   3. Keep bandage on for the next 24 hrs. Steri strips will fall off on its own   Warning Signs   Call the clinic if any of the following occurs:     You have bleeding, pus, or increasing redness, or pain at insertion site.     You have fever or chills     The implant comes out or you have concerns about its location.     You have a positive pregnancy test or suspect you might be pregnant.

## 2022-01-12 ENCOUNTER — MEDICAL CORRESPONDENCE (OUTPATIENT)
Dept: HEALTH INFORMATION MANAGEMENT | Facility: CLINIC | Age: 37
End: 2022-01-12
Payer: COMMERCIAL

## 2022-01-14 DIAGNOSIS — F39 EPISODIC MOOD DISORDER (H): ICD-10-CM

## 2022-01-14 DIAGNOSIS — F31.32 MODERATE BIPOLAR I DISORDER, MOST RECENT EPISODE DEPRESSED (H): ICD-10-CM

## 2022-01-14 DIAGNOSIS — Z79.899 DRUG THERAPY: Primary | ICD-10-CM

## 2022-01-14 DIAGNOSIS — F43.10 POSTTRAUMATIC STRESS DISORDER: ICD-10-CM

## 2022-01-14 DIAGNOSIS — F39 MOOD DISORDER (H): ICD-10-CM

## 2022-09-08 PROCEDURE — 99285 EMERGENCY DEPT VISIT HI MDM: CPT | Mod: 25

## 2022-09-09 ENCOUNTER — HOSPITAL ENCOUNTER (EMERGENCY)
Facility: CLINIC | Age: 37
Discharge: HOME | End: 2022-09-09
Attending: EMERGENCY MEDICINE | Admitting: EMERGENCY MEDICINE
Payer: COMMERCIAL

## 2022-09-09 VITALS
OXYGEN SATURATION: 99 % | TEMPERATURE: 98.1 F | SYSTOLIC BLOOD PRESSURE: 111 MMHG | HEART RATE: 80 BPM | RESPIRATION RATE: 14 BRPM | DIASTOLIC BLOOD PRESSURE: 70 MMHG

## 2022-09-09 DIAGNOSIS — F31.2 BIPOLAR AFFECTIVE DISORDER, CURRENTLY MANIC, SEVERE, WITH PSYCHOTIC FEATURES (H): ICD-10-CM

## 2022-09-09 DIAGNOSIS — F41.9 ANXIETY: ICD-10-CM

## 2022-09-09 DIAGNOSIS — G47.00 INSOMNIA, UNSPECIFIED TYPE: ICD-10-CM

## 2022-09-09 DIAGNOSIS — Z65.8 DOMESTIC CONCERNS: ICD-10-CM

## 2022-09-09 LAB — HCG UR QL: NEGATIVE

## 2022-09-09 PROCEDURE — 250N000013 HC RX MED GY IP 250 OP 250 PS 637: Performed by: EMERGENCY MEDICINE

## 2022-09-09 PROCEDURE — 81025 URINE PREGNANCY TEST: CPT | Performed by: EMERGENCY MEDICINE

## 2022-09-09 PROCEDURE — 90791 PSYCH DIAGNOSTIC EVALUATION: CPT

## 2022-09-09 RX ORDER — HYDROXYZINE HYDROCHLORIDE 25 MG/1
25 TABLET, FILM COATED ORAL EVERY 6 HOURS PRN
Qty: 15 TABLET | Refills: 0 | Status: SHIPPED | OUTPATIENT
Start: 2022-09-09

## 2022-09-09 RX ORDER — HYDROXYZINE HYDROCHLORIDE 25 MG/1
25 TABLET, FILM COATED ORAL ONCE
Status: COMPLETED | OUTPATIENT
Start: 2022-09-09 | End: 2022-09-09

## 2022-09-09 RX ADMIN — HYDROXYZINE HYDROCHLORIDE 25 MG: 25 TABLET ORAL at 03:10

## 2022-09-09 ASSESSMENT — ENCOUNTER SYMPTOMS
UNEXPECTED WEIGHT CHANGE: 1
NERVOUS/ANXIOUS: 1
VOMITING: 1

## 2022-09-09 ASSESSMENT — ACTIVITIES OF DAILY LIVING (ADL)
ADLS_ACUITY_SCORE: 35
ADLS_ACUITY_SCORE: 35
ADLS_ACUITY_SCORE: 33
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
## 2023-10-11 ENCOUNTER — PATIENT OUTREACH (OUTPATIENT)
Dept: FAMILY MEDICINE | Facility: CLINIC | Age: 38
End: 2023-10-11
Payer: COMMERCIAL

## 2023-10-11 NOTE — TELEPHONE ENCOUNTER
Patient Quality Outreach    Patient is due for the following:   Physical Preventive Adult Physical    Next Steps:   Schedule a Adult Preventative    Type of outreach:    Phone, left message for patient/parent to call back.    Next Steps:  Reach out within 90 days via Letter.    Max number of attempts reached: No. Will try again in 90 days if patient still on fail list.    Questions for provider review:    None           Tamiko Vallejo